# Patient Record
Sex: FEMALE | Race: WHITE | Employment: UNEMPLOYED | URBAN - METROPOLITAN AREA
[De-identification: names, ages, dates, MRNs, and addresses within clinical notes are randomized per-mention and may not be internally consistent; named-entity substitution may affect disease eponyms.]

---

## 2021-01-13 ENCOUNTER — VIRTUAL VISIT (OUTPATIENT)
Dept: FAMILY MEDICINE CLINIC | Age: 61
End: 2021-01-13
Payer: MEDICARE

## 2021-01-13 DIAGNOSIS — Z13.31 POSITIVE DEPRESSION SCREENING: Primary | ICD-10-CM

## 2021-01-13 DIAGNOSIS — M32.9 LUPUS (HCC): ICD-10-CM

## 2021-01-13 DIAGNOSIS — K59.09 CONSTIPATION, CHRONIC: ICD-10-CM

## 2021-01-13 PROCEDURE — G8431 POS CLIN DEPRES SCRN F/U DOC: HCPCS | Performed by: FAMILY MEDICINE

## 2021-01-13 PROCEDURE — 99204 OFFICE O/P NEW MOD 45 MIN: CPT | Performed by: FAMILY MEDICINE

## 2021-01-13 PROCEDURE — 3017F COLORECTAL CA SCREEN DOC REV: CPT | Performed by: FAMILY MEDICINE

## 2021-01-13 PROCEDURE — G8427 DOCREV CUR MEDS BY ELIG CLIN: HCPCS | Performed by: FAMILY MEDICINE

## 2021-01-13 RX ORDER — SUMATRIPTAN 100 MG/1
TABLET, FILM COATED ORAL
COMMUNITY
Start: 2020-12-17

## 2021-01-13 RX ORDER — HYDROCODONE BITARTRATE AND ACETAMINOPHEN 7.5; 325 MG/1; MG/1
TABLET ORAL
COMMUNITY
Start: 2020-12-18

## 2021-01-13 RX ORDER — PANTOPRAZOLE SODIUM 40 MG/1
40 GRANULE, DELAYED RELEASE ORAL
COMMUNITY

## 2021-01-13 RX ORDER — SIMVASTATIN 20 MG
20 TABLET ORAL DAILY
COMMUNITY
Start: 2020-12-17 | End: 2022-07-01 | Stop reason: SDUPTHER

## 2021-01-13 RX ORDER — ALPRAZOLAM 0.5 MG/1
1.5 TABLET ORAL DAILY
COMMUNITY
Start: 2020-12-24

## 2021-01-13 RX ORDER — MONTELUKAST SODIUM 10 MG/1
1 TABLET ORAL
COMMUNITY
End: 2021-02-25 | Stop reason: ALTCHOICE

## 2021-01-13 RX ORDER — M-VIT,TX,IRON,MINS/CALC/FOLIC 27MG-0.4MG
1 TABLET ORAL DAILY
COMMUNITY

## 2021-01-13 RX ORDER — ONDANSETRON HYDROCHLORIDE 8 MG/1
8 TABLET, FILM COATED ORAL PRN
COMMUNITY
Start: 2020-11-25

## 2021-01-13 RX ORDER — ACETAMINOPHEN 160 MG
TABLET,DISINTEGRATING ORAL
COMMUNITY

## 2021-01-13 RX ORDER — FAMOTIDINE 20 MG/1
20 TABLET, FILM COATED ORAL 2 TIMES DAILY PRN
COMMUNITY
Start: 2020-12-22 | End: 2021-02-25 | Stop reason: ALTCHOICE

## 2021-01-13 RX ORDER — CONJUGATED ESTROGENS 0.62 MG/G
CREAM VAGINAL
COMMUNITY
Start: 2020-11-02

## 2021-01-13 SDOH — ECONOMIC STABILITY: INCOME INSECURITY: HOW HARD IS IT FOR YOU TO PAY FOR THE VERY BASICS LIKE FOOD, HOUSING, MEDICAL CARE, AND HEATING?: NOT HARD AT ALL

## 2021-01-13 SDOH — ECONOMIC STABILITY: FOOD INSECURITY: WITHIN THE PAST 12 MONTHS, THE FOOD YOU BOUGHT JUST DIDN'T LAST AND YOU DIDN'T HAVE MONEY TO GET MORE.: NEVER TRUE

## 2021-01-13 SDOH — ECONOMIC STABILITY: FOOD INSECURITY: WITHIN THE PAST 12 MONTHS, YOU WORRIED THAT YOUR FOOD WOULD RUN OUT BEFORE YOU GOT MONEY TO BUY MORE.: NEVER TRUE

## 2021-01-13 SDOH — ECONOMIC STABILITY: TRANSPORTATION INSECURITY
IN THE PAST 12 MONTHS, HAS LACK OF TRANSPORTATION KEPT YOU FROM MEETINGS, WORK, OR FROM GETTING THINGS NEEDED FOR DAILY LIVING?: NO

## 2021-01-13 SDOH — ECONOMIC STABILITY: TRANSPORTATION INSECURITY
IN THE PAST 12 MONTHS, HAS THE LACK OF TRANSPORTATION KEPT YOU FROM MEDICAL APPOINTMENTS OR FROM GETTING MEDICATIONS?: NO

## 2021-01-13 ASSESSMENT — PATIENT HEALTH QUESTIONNAIRE - PHQ9
5. POOR APPETITE OR OVEREATING: 0
SUM OF ALL RESPONSES TO PHQ QUESTIONS 1-9: 10
4. FEELING TIRED OR HAVING LITTLE ENERGY: 2
7. TROUBLE CONCENTRATING ON THINGS, SUCH AS READING THE NEWSPAPER OR WATCHING TELEVISION: 3
10. IF YOU CHECKED OFF ANY PROBLEMS, HOW DIFFICULT HAVE THESE PROBLEMS MADE IT FOR YOU TO DO YOUR WORK, TAKE CARE OF THINGS AT HOME, OR GET ALONG WITH OTHER PEOPLE: 2
9. THOUGHTS THAT YOU WOULD BE BETTER OFF DEAD, OR OF HURTING YOURSELF: 0
3. TROUBLE FALLING OR STAYING ASLEEP: 2

## 2021-01-13 ASSESSMENT — ENCOUNTER SYMPTOMS
ABDOMINAL PAIN: 1
BACK PAIN: 1
CONSTIPATION: 1

## 2021-01-13 ASSESSMENT — COLUMBIA-SUICIDE SEVERITY RATING SCALE - C-SSRS
2. HAVE YOU ACTUALLY HAD ANY THOUGHTS OF KILLING YOURSELF?: NO
1. WITHIN THE PAST MONTH, HAVE YOU WISHED YOU WERE DEAD OR WISHED YOU COULD GO TO SLEEP AND NOT WAKE UP?: NO
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
## 2021-02-25 ENCOUNTER — OFFICE VISIT (OUTPATIENT)
Dept: FAMILY MEDICINE CLINIC | Age: 61
End: 2021-02-25
Payer: MEDICARE

## 2021-02-25 VITALS
HEART RATE: 103 BPM | OXYGEN SATURATION: 96 % | DIASTOLIC BLOOD PRESSURE: 84 MMHG | RESPIRATION RATE: 16 BRPM | BODY MASS INDEX: 23.55 KG/M2 | TEMPERATURE: 97.8 F | WEIGHT: 128 LBS | SYSTOLIC BLOOD PRESSURE: 136 MMHG | HEIGHT: 62 IN

## 2021-02-25 DIAGNOSIS — F41.9 ANXIETY: ICD-10-CM

## 2021-02-25 DIAGNOSIS — Z00.00 ROUTINE GENERAL MEDICAL EXAMINATION AT A HEALTH CARE FACILITY: Primary | ICD-10-CM

## 2021-02-25 DIAGNOSIS — H53.9 VISION CHANGES: ICD-10-CM

## 2021-02-25 PROCEDURE — G0439 PPPS, SUBSEQ VISIT: HCPCS | Performed by: FAMILY MEDICINE

## 2021-02-25 PROCEDURE — G8484 FLU IMMUNIZE NO ADMIN: HCPCS | Performed by: FAMILY MEDICINE

## 2021-02-25 PROCEDURE — 3017F COLORECTAL CA SCREEN DOC REV: CPT | Performed by: FAMILY MEDICINE

## 2021-02-25 ASSESSMENT — PATIENT HEALTH QUESTIONNAIRE - PHQ9
SUM OF ALL RESPONSES TO PHQ QUESTIONS 1-9: 0
2. FEELING DOWN, DEPRESSED OR HOPELESS: 0
SUM OF ALL RESPONSES TO PHQ QUESTIONS 1-9: 0
1. LITTLE INTEREST OR PLEASURE IN DOING THINGS: 0
SUM OF ALL RESPONSES TO PHQ QUESTIONS 1-9: 0

## 2021-02-25 NOTE — PATIENT INSTRUCTIONS
Personalized Preventive Plan for Juli Pope - 2/25/2021  Medicare offers a range of preventive health benefits. Some of the tests and screenings are paid in full while other may be subject to a deductible, co-insurance, and/or copay. Some of these benefits include a comprehensive review of your medical history including lifestyle, illnesses that may run in your family, and various assessments and screenings as appropriate. After reviewing your medical record and screening and assessments performed today your provider may have ordered immunizations, labs, imaging, and/or referrals for you. A list of these orders (if applicable) as well as your Preventive Care list are included within your After Visit Summary for your review. Other Preventive Recommendations:    · A preventive eye exam performed by an eye specialist is recommended every 1-2 years to screen for glaucoma; cataracts, macular degeneration, and other eye disorders. · A preventive dental visit is recommended every 6 months. · Try to get at least 150 minutes of exercise per week or 10,000 steps per day on a pedometer . · Order or download the FREE \"Exercise & Physical Activity: Your Everyday Guide\" from [employer]. Call 5-781.827.8878 or search [employer] online. · You need 1312-1882 mg of calcium and 0035-6523 IU of vitamin D per day. It is possible to meet your calcium requirement with diet alone, but a vitamin D supplement is usually necessary to meet this goal.  · When exposed to the sun, use a sunscreen that protects against both UVA and UVB radiation with an SPF of 30 or greater. Reapply every 2 to 3 hours or after sweating, drying off with a towel, or swimming. · Always wear a seat belt when traveling in a car. Always wear a helmet when riding a bicycle or motorcycle.

## 2021-02-25 NOTE — PROGRESS NOTES
Medicare Annual Wellness Visit  Name: Elysia Calderon Date: 2021   MRN: <[id number]> Sex: Female   Age: 62 y.o. Ethnicity: Non-/Non    : 1960 Race: Marni Urban is here for Medicare AWV    Screenings for behavioral, psychosocial and functional/safety risks, and cognitive dysfunction are all negative except as indicated below. These results, as well as other patient data from the [address] form, are documented in Flowsheets linked to this Encounter. Allergies   Allergen Reactions    Ibuprofen     Methadone     Carisoprodol Nausea And Vomiting       Prior to Visit Medications    Medication Sig Taking? Authorizing Provider   HYDROcodone-acetaminophen ([facility]) 7.5-325 MG per tablet  Yes Historical Provider, MD   simvastatin (ZOCOR) 20 MG tablet Take 20 mg by mouth daily  Yes Historical Provider, MD   SUMAtriptan (IMITREX) 100 MG tablet once as needed  Yes Historical Provider, MD   ondansetron (ZOFRAN) 8 MG tablet Take 8 mg by mouth as needed  Yes Historical Provider, MD   ALPRAZolam (XANAX) 0.5 MG tablet 1.5 mg daily. Yes Historical Provider, MD   PREMARIN 0.625 MG/GM vaginal cream  Yes Historical Provider, MD   Cholecalciferol (VITAMIN D3) 50 MCG ( UT) CAPS Take by mouth Yes Historical Provider, MD   Multiple Vitamins-Minerals (THERAPEUTIC MULTIVITAMIN-MINERALS) tablet Take 1 tablet by mouth daily Yes Historical Provider, MD   Fexofenadine HCl (ALLEGRA PO) Take 180 mg by mouth as needed Yes Historical Provider, MD   pantoprazole sodium (PROTONIX) 40 MG PACK packet Take 40 mg by mouth every morning (before breakfast) Yes Historical Provider, MD   medical marijuana Take by mouth as needed.  Yes Historical Provider, MD       Past Medical History:   Diagnosis Date    Hyperlipidemia     Lupus ([facility] [address]        Past Surgical History:   Procedure Laterality Date    APPENDECTOMY      TONSILLECTOMY      TUBAL LIGATION  1987       Family History Problem Relation Age of Onset    Heart Attack Mother     Alzheimer's Disease Father     Heart Disease Father     Atrial Fibrillation Father     Cancer Maternal Grandmother         stomach    Other Maternal Grandfather         tragic injury    Alzheimer's Disease Paternal Grandmother     Cancer Paternal Grandfather         prostate       CareTeam (Including outside providers/suppliers regularly involved in providing care):   Patient Care Team:  Fauzia hCambers DO as PCP - General (Family Medicine)  Fauzia Chambers DO as PCP - [facility] Empaneled Provider    Wt Readings from Last 3 Encounters:   02/25/21 128 lb (58.1 kg)     Vitals:    02/25/21 0926   BP: 136/84   Site: Left Upper Arm   Position: Sitting   Pulse: 103   Resp: 16   Temp: 97.8 °F (36.6 °C)   TempSrc: Temporal   SpO2: 96%   Weight: 128 lb (58.1 kg)   Height: 5' 1.75\" (1.568 m)     Body mass index is 23.6 kg/m². Based upon direct observation of the patient, evaluation of cognition reveals recent and remote memory intact. Patient's complete Health Risk Assessment and screening values have been reviewed and are found in Flowsheets. The following problems were reviewed today and where indicated follow up appointments were made and/or referrals ordered. Positive Risk Factor Screenings with Interventions:         Substance History:  Social History     Tobacco History     Smoking Status  Current Every Day Smoker Smoking Frequency  0.25 packs/day for 25 years (6.25 pk yrs) Smoking Tobacco Type  Cigarettes    Smokeless Tobacco Use  Never Used          Alcohol History     Alcohol Use Status  Not Currently          Drug Use     Drug Use Status  Yes Types  Marijuana Comment  gummies          Sexual Activity     Sexually Active  Yes Partners  Male               Alcohol Screening:       A score of 8 or more is associated with harmful or hazardous drinking.  A score of 13 or more in women, and 15 or more in men, is likely to indicate alcohol dependence. Substance Abuse Interventions:  · Tobacco abuse:  provider spent 0 minutes counseling patient. Will address tobacco use during future visits. General Health and ACP:  General  In general, how would you say your health is?: Fair  In the past 7 days, have you experienced any of the following? New or Increased Pain, New or Increased Fatigue, Loneliness, Social Isolation, Stress or Anger?: None of These  Do you get the social and emotional support that you need?: Yes  Do you have a Living Will?: Yes  Advance Directives     Power of [facility] Will ACP-Advance Directive ACP-Power of     Not on File Not on File Not on File Not on File      General Health Risk Interventions:  · No interventions needed in this area at this time. Hearing/Vision:  No exam data present  Hearing/Vision  Do you or your family notice any trouble with your hearing that hasn't been managed with hearing aids?: No  Do you have difficulty driving, watching TV, or doing any of your daily activities because of your eyesight?: (!) Yes  Have you had an eye exam within the past year?: (!) No  Hearing/Vision Interventions:  · Vision concerns:  ophthalmology/optometry referral provided      Personalized Preventive Plan   Current Health Maintenance Status    There is no immunization history on file for this patient.      Health Maintenance   Topic Date Due    Hepatitis C screen  1960    Pneumococcal 0-64 years Vaccine (1 of 1 - PPSV23) 06/06/1967    Lipid screen  06/06/1971    HIV screen  06/06/1976    DTaP/Tdap/Td vaccine (1 - Tdap) 06/06/1980    Cervical cancer screen  06/06/1982    Breast cancer screen  06/06/2011    Shingles Vaccine (1 of 2) 06/06/2011    Colon cancer screen colonoscopy  06/06/2011    Flu vaccine (1) 09/01/2020    Annual Wellness Visit (AWV)  01/03/2021    Hepatitis A vaccine  Aged Out    Hepatitis B vaccine  Aged Out    Hib vaccine  Aged Out    Meningococcal (ACWY) vaccine  Aged Out Recommendations for Preventive Services Due: see orders and patient instructions/AVS.  . Recommended screening schedule for the next 5-10 years is provided to the patient in written form: see Patient Instructions/AVS.    Lorena Lui was seen today for medicare aw.     Diagnoses and all orders for this visit:    Routine general medical examination at a health care facility

## 2021-02-26 ENCOUNTER — NURSE ONLY (OUTPATIENT)
Dept: FAMILY MEDICINE CLINIC | Age: 61
End: 2021-02-26

## 2021-02-26 VITALS — TEMPERATURE: 98.4 F

## 2021-02-26 DIAGNOSIS — M32.9 LUPUS (HCC): ICD-10-CM

## 2021-02-27 LAB
A/G RATIO: 1.7 (ref 1.1–2.2)
ALBUMIN SERPL-MCNC: 4.6 G/DL (ref 3.4–5)
ALP BLD-CCNC: 100 U/L (ref 40–129)
ALT SERPL-CCNC: 20 U/L (ref 10–40)
ANION GAP SERPL CALCULATED.3IONS-SCNC: 10 MMOL/L (ref 3–16)
AST SERPL-CCNC: 22 U/L (ref 15–37)
BASOPHILS ABSOLUTE: 0 K/UL (ref 0–0.2)
BASOPHILS RELATIVE PERCENT: 0.5 %
BILIRUB SERPL-MCNC: 0.3 MG/DL (ref 0–1)
BUN BLDV-MCNC: 19 MG/DL (ref 7–20)
CALCIUM SERPL-MCNC: 10 MG/DL (ref 8.3–10.6)
CHLORIDE BLD-SCNC: 109 MMOL/L (ref 99–110)
CHOLESTEROL, TOTAL: 193 MG/DL (ref 0–199)
CO2: 27 MMOL/L (ref 21–32)
CREAT SERPL-MCNC: 0.7 MG/DL (ref 0.6–1.1)
EOSINOPHILS ABSOLUTE: 0 K/UL (ref 0–0.6)
EOSINOPHILS RELATIVE PERCENT: 0.6 %
GFR AFRICAN AMERICAN: >60
GFR NON-AFRICAN AMERICAN: >60
GLOBULIN: 2.7 G/DL
GLUCOSE BLD-MCNC: 86 MG/DL (ref 70–99)
HCT VFR BLD CALC: 41.8 % (ref 36–48)
HDLC SERPL-MCNC: 43 MG/DL (ref 40–60)
HEMOGLOBIN: 14 G/DL (ref 12–16)
LDL CHOLESTEROL CALCULATED: 121 MG/DL
LYMPHOCYTES ABSOLUTE: 2.3 K/UL (ref 1–5.1)
LYMPHOCYTES RELATIVE PERCENT: 34.8 %
MCH RBC QN AUTO: 31.8 PG (ref 26–34)
MCHC RBC AUTO-ENTMCNC: 33.5 G/DL (ref 31–36)
MCV RBC AUTO: 94.8 FL (ref 80–100)
MONOCYTES ABSOLUTE: 0.4 K/UL (ref 0–1.3)
MONOCYTES RELATIVE PERCENT: 5.9 %
NEUTROPHILS ABSOLUTE: 3.9 K/UL (ref 1.7–7.7)
NEUTROPHILS RELATIVE PERCENT: 58.2 %
PDW BLD-RTO: 13.1 % (ref 12.4–15.4)
PLATELET # BLD: 189 K/UL (ref 135–450)
PMV BLD AUTO: 9.3 FL (ref 5–10.5)
POTASSIUM SERPL-SCNC: 4.7 MMOL/L (ref 3.5–5.1)
RBC # BLD: 4.41 M/UL (ref 4–5.2)
SODIUM BLD-SCNC: 146 MMOL/L (ref 136–145)
TOTAL PROTEIN: 7.3 G/DL (ref 6.4–8.2)
TRIGL SERPL-MCNC: 144 MG/DL (ref 0–150)
TSH SERPL DL<=0.05 MIU/L-ACNC: 1.05 UIU/ML (ref 0.27–4.2)
VLDLC SERPL CALC-MCNC: 29 MG/DL
WBC # BLD: 6.7 K/UL (ref 4–11)

## 2022-06-27 ENCOUNTER — TELEMEDICINE (OUTPATIENT)
Dept: FAMILY MEDICINE CLINIC | Age: 62
End: 2022-06-27
Payer: MEDICARE

## 2022-06-27 DIAGNOSIS — J30.9 ALLERGIC RHINITIS, UNSPECIFIED SEASONALITY, UNSPECIFIED TRIGGER: ICD-10-CM

## 2022-06-27 DIAGNOSIS — M32.9 LUPUS (HCC): ICD-10-CM

## 2022-06-27 DIAGNOSIS — Z13.6 ENCOUNTER FOR SCREENING FOR CARDIOVASCULAR DISORDERS: ICD-10-CM

## 2022-06-27 DIAGNOSIS — Z12.31 ENCOUNTER FOR SCREENING MAMMOGRAM FOR MALIGNANT NEOPLASM OF BREAST: ICD-10-CM

## 2022-06-27 DIAGNOSIS — H92.09 OTALGIA, UNSPECIFIED LATERALITY: Primary | ICD-10-CM

## 2022-06-27 DIAGNOSIS — H53.9 VISION CHANGES: ICD-10-CM

## 2022-06-27 PROCEDURE — 3017F COLORECTAL CA SCREEN DOC REV: CPT | Performed by: NURSE PRACTITIONER

## 2022-06-27 PROCEDURE — G8421 BMI NOT CALCULATED: HCPCS | Performed by: NURSE PRACTITIONER

## 2022-06-27 PROCEDURE — 4004F PT TOBACCO SCREEN RCVD TLK: CPT | Performed by: NURSE PRACTITIONER

## 2022-06-27 PROCEDURE — 99214 OFFICE O/P EST MOD 30 MIN: CPT | Performed by: NURSE PRACTITIONER

## 2022-06-27 PROCEDURE — G8427 DOCREV CUR MEDS BY ELIG CLIN: HCPCS | Performed by: NURSE PRACTITIONER

## 2022-06-27 RX ORDER — METHYLPREDNISOLONE 4 MG/1
TABLET ORAL
Qty: 1 KIT | Refills: 0 | Status: SHIPPED | OUTPATIENT
Start: 2022-06-27 | End: 2022-07-06

## 2022-06-27 ASSESSMENT — PATIENT HEALTH QUESTIONNAIRE - PHQ9
SUM OF ALL RESPONSES TO PHQ QUESTIONS 1-9: 0
SUM OF ALL RESPONSES TO PHQ9 QUESTIONS 1 & 2: 0
1. LITTLE INTEREST OR PLEASURE IN DOING THINGS: 0
SUM OF ALL RESPONSES TO PHQ QUESTIONS 1-9: 0
2. FEELING DOWN, DEPRESSED OR HOPELESS: 0
SUM OF ALL RESPONSES TO PHQ QUESTIONS 1-9: 0
SUM OF ALL RESPONSES TO PHQ QUESTIONS 1-9: 0

## 2022-06-27 ASSESSMENT — ANXIETY QUESTIONNAIRES
2. NOT BEING ABLE TO STOP OR CONTROL WORRYING: 0
6. BECOMING EASILY ANNOYED OR IRRITABLE: 0
IF YOU CHECKED OFF ANY PROBLEMS ON THIS QUESTIONNAIRE, HOW DIFFICULT HAVE THESE PROBLEMS MADE IT FOR YOU TO DO YOUR WORK, TAKE CARE OF THINGS AT HOME, OR GET ALONG WITH OTHER PEOPLE: NOT DIFFICULT AT ALL
1. FEELING NERVOUS, ANXIOUS, OR ON EDGE: 0
4. TROUBLE RELAXING: 0
5. BEING SO RESTLESS THAT IT IS HARD TO SIT STILL: 0
GAD7 TOTAL SCORE: 0
3. WORRYING TOO MUCH ABOUT DIFFERENT THINGS: 0
7. FEELING AFRAID AS IF SOMETHING AWFUL MIGHT HAPPEN: 0

## 2022-06-27 ASSESSMENT — ENCOUNTER SYMPTOMS
CHEST TIGHTNESS: 0
SINUS PRESSURE: 1
EYE PAIN: 0
EYE REDNESS: 0
COUGH: 1
GASTROINTESTINAL NEGATIVE: 1
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
## 2022-06-28 DIAGNOSIS — J30.9 ALLERGIC RHINITIS, UNSPECIFIED SEASONALITY, UNSPECIFIED TRIGGER: ICD-10-CM

## 2022-06-28 DIAGNOSIS — H53.9 VISION CHANGES: ICD-10-CM

## 2022-06-28 DIAGNOSIS — Z13.6 ENCOUNTER FOR SCREENING FOR CARDIOVASCULAR DISORDERS: ICD-10-CM

## 2022-06-28 DIAGNOSIS — M32.9 LUPUS (HCC): ICD-10-CM

## 2022-06-28 DIAGNOSIS — H92.09 OTALGIA, UNSPECIFIED LATERALITY: ICD-10-CM

## 2022-06-28 LAB
A/G RATIO: 1.8 (ref 1.1–2.2)
ALBUMIN SERPL-MCNC: 4.6 G/DL (ref 3.4–5)
ALP BLD-CCNC: 99 U/L (ref 40–129)
ALT SERPL-CCNC: 16 U/L (ref 10–40)
ANION GAP SERPL CALCULATED.3IONS-SCNC: 18 MMOL/L (ref 3–16)
AST SERPL-CCNC: 24 U/L (ref 15–37)
BILIRUB SERPL-MCNC: <0.2 MG/DL (ref 0–1)
BUN BLDV-MCNC: 16 MG/DL (ref 7–20)
CALCIUM SERPL-MCNC: 10 MG/DL (ref 8.3–10.6)
CHLORIDE BLD-SCNC: 100 MMOL/L (ref 99–110)
CHOLESTEROL, TOTAL: 244 MG/DL (ref 0–199)
CO2: 22 MMOL/L (ref 21–32)
CREAT SERPL-MCNC: 0.8 MG/DL (ref 0.6–1.2)
GFR AFRICAN AMERICAN: >60
GFR NON-AFRICAN AMERICAN: >60
GLUCOSE BLD-MCNC: 107 MG/DL (ref 70–99)
HDLC SERPL-MCNC: 60 MG/DL (ref 40–60)
LDL CHOLESTEROL CALCULATED: 166 MG/DL
POTASSIUM SERPL-SCNC: 4.5 MMOL/L (ref 3.5–5.1)
SODIUM BLD-SCNC: 140 MMOL/L (ref 136–145)
TOTAL PROTEIN: 7.2 G/DL (ref 6.4–8.2)
TRIGL SERPL-MCNC: 91 MG/DL (ref 0–150)
TSH REFLEX FT4: 0.6 UIU/ML (ref 0.27–4.2)
VLDLC SERPL CALC-MCNC: 18 MG/DL

## 2022-07-01 RX ORDER — SIMVASTATIN 20 MG
20 TABLET ORAL DAILY
Qty: 30 TABLET | Refills: 2 | Status: SHIPPED | OUTPATIENT
Start: 2022-07-01

## 2022-07-05 PROBLEM — R73.01 ELEVATED FASTING GLUCOSE: Status: ACTIVE | Noted: 2022-07-05

## 2022-07-05 PROBLEM — E78.00 ELEVATED LDL CHOLESTEROL LEVEL: Status: ACTIVE | Noted: 2022-07-05

## 2022-07-05 NOTE — PROGRESS NOTES
2022    TELEHEALTH EVALUATION -- Audio/Visual (During PYGAE-58 public health emergency)    HPI:    Jenny Lieberman (:  1960) has requested an audio/video evaluation for the following concern(s):    Pt presents today via video visit for lab results follow-up. Labs from 2022 reviewed at today's visit, within normal limits except for glucose 107, total cholesterol 244, . States that her mother has diabetes. Has been off the Simvastatin 20 mg. Was refilled by Zoey Ching CNP on 22 and pt has started taking again. Admits to MARX's last week after snorkeling. Sees Pain Management once a month. Also states that she had COVID in 2021 and has palpitations since then. Occur 1-2 times a day, admits to anxiety and occasional palpations before having COVID    Review of Systems   Constitutional: Negative for fatigue. Eyes: Negative for visual disturbance. Neurological: Positive for headaches. Prior to Visit Medications    Medication Sig Taking? Authorizing Provider   simvastatin (ZOCOR) 20 MG tablet Take 1 tablet by mouth daily Yes ANEL Sheffield CNP   HYDROcodone-acetaminophen ([address]) 7.5-325 MG per tablet  Yes Historical Provider, MD   SUMAtriptan (IMITREX) 100 MG tablet once as needed  Yes Historical Provider, MD   ondansetron (ZOFRAN) 8 MG tablet Take 8 mg by mouth as needed  Yes Historical Provider, MD   ALPRAZolam [facility]) 0.5 MG tablet 1.5 mg daily.   Yes Historical Provider, MD   PREMARIN 0.625 MG/GM vaginal cream  Yes Historical Provider, MD   Cholecalciferol (VITAMIN D3) 50 MCG (2000) CAPS Take by mouth Yes Historical Provider, MD   Multiple Vitamins-Minerals (THERAPEUTIC MULTIVITAMIN-MINERALS) tablet Take 1 tablet by mouth daily Yes Historical Provider, MD   Fexofenadine HCl (ALLEGRA PO) Take 180 mg by mouth as needed Yes Historical Provider, MD   pantoprazole sodium (PROTONIX) 40 MG PACK packet Take 40 mg by mouth every morning (before exam to video visit)          [x] No gaze palsy        [] Abnormal-         Skin:        [x] No significant exanthematous lesions or discoloration noted on facial skin         [] Abnormal-            Psychiatric:       [x] Normal Affect [] No Hallucinations        [] Abnormal-     Other pertinent observable physical exam findings-     ASSESSMENT/PLAN:   Diagnosis Orders   1. Elevated LDL cholesterol level  Has restarted Simvastatin, recheck lipd panel in 3 months   2. Elevated fasting glucose  Recheck in 3 months     3. Palpitations                                                              Referral Cardiology, Dr. Jeane Kong, was evaluated through a synchronous (real-time) audio-video encounter. The patient (or guardian if applicable) is aware that this is a billable service. Verbal consent to proceed has been obtained within the past 12 months. The visit was conducted pursuant to the emergency declaration under the [address] [place] [address] authority and the [employer] and [employer] General Act. Patient identification was verified, and a caregiver was present when appropriate. The patient was located in a state where the provider was credentialed to provide care. Total time spent on this encounter: Not billed by time    --Kirk Cowart DO on 7/6/2022 at 2:34 PM    An electronic signature was used to authenticate this note.

## 2022-07-06 ENCOUNTER — TELEMEDICINE (OUTPATIENT)
Dept: FAMILY MEDICINE CLINIC | Age: 62
End: 2022-07-06
Payer: MEDICARE

## 2022-07-06 ENCOUNTER — HOSPITAL ENCOUNTER (OUTPATIENT)
Dept: MAMMOGRAPHY | Age: 62
Discharge: HOME | End: 2022-07-06
Payer: MEDICARE

## 2022-07-06 VITALS — BODY MASS INDEX: 22.66 KG/M2 | WEIGHT: 120 LBS | HEIGHT: 61 IN

## 2022-07-06 DIAGNOSIS — R00.2 PALPITATIONS: ICD-10-CM

## 2022-07-06 DIAGNOSIS — E78.00 ELEVATED LDL CHOLESTEROL LEVEL: Primary | ICD-10-CM

## 2022-07-06 DIAGNOSIS — Z12.31 ENCOUNTER FOR SCREENING MAMMOGRAM FOR MALIGNANT NEOPLASM OF BREAST: ICD-10-CM

## 2022-07-06 DIAGNOSIS — R73.01 ELEVATED FASTING GLUCOSE: ICD-10-CM

## 2022-07-06 PROCEDURE — 99214 OFFICE O/P EST MOD 30 MIN: CPT | Performed by: FAMILY MEDICINE

## 2022-07-06 PROCEDURE — 77063 BREAST TOMOSYNTHESIS BI: CPT

## 2022-07-06 ASSESSMENT — ANXIETY QUESTIONNAIRES
GAD7 TOTAL SCORE: 0
IF YOU CHECKED OFF ANY PROBLEMS ON THIS QUESTIONNAIRE, HOW DIFFICULT HAVE THESE PROBLEMS MADE IT FOR YOU TO DO YOUR WORK, TAKE CARE OF THINGS AT HOME, OR GET ALONG WITH OTHER PEOPLE: NOT DIFFICULT AT ALL
6. BECOMING EASILY ANNOYED OR IRRITABLE: 0
2. NOT BEING ABLE TO STOP OR CONTROL WORRYING: 0
3. WORRYING TOO MUCH ABOUT DIFFERENT THINGS: 0
1. FEELING NERVOUS, ANXIOUS, OR ON EDGE: 0
4. TROUBLE RELAXING: 0
5. BEING SO RESTLESS THAT IT IS HARD TO SIT STILL: 0
7. FEELING AFRAID AS IF SOMETHING AWFUL MIGHT HAPPEN: 0

## 2022-07-06 ASSESSMENT — PATIENT HEALTH QUESTIONNAIRE - PHQ9
SUM OF ALL RESPONSES TO PHQ QUESTIONS 1-9: 0
2. FEELING DOWN, DEPRESSED OR HOPELESS: 0
SUM OF ALL RESPONSES TO PHQ9 QUESTIONS 1 & 2: 0
SUM OF ALL RESPONSES TO PHQ QUESTIONS 1-9: 0
1. LITTLE INTEREST OR PLEASURE IN DOING THINGS: 0
SUM OF ALL RESPONSES TO PHQ QUESTIONS 1-9: 0
SUM OF ALL RESPONSES TO PHQ QUESTIONS 1-9: 0

## 2022-07-13 NOTE — RESULT ENCOUNTER NOTE
Your mammogram is normal. We can repeat the screening in one year. Thank you for getting this important health screening accomplished.
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
## 2022-08-12 ENCOUNTER — OFFICE VISIT (OUTPATIENT)
Dept: CARDIOLOGY CLINIC | Age: 62
End: 2022-08-12
Payer: MEDICARE

## 2022-08-12 ENCOUNTER — TELEPHONE (OUTPATIENT)
Dept: CARDIOLOGY CLINIC | Age: 62
End: 2022-08-12

## 2022-08-12 VITALS
BODY MASS INDEX: 23.19 KG/M2 | HEIGHT: 61 IN | HEART RATE: 86 BPM | WEIGHT: 122.85 LBS | OXYGEN SATURATION: 99 % | SYSTOLIC BLOOD PRESSURE: 134 MMHG | DIASTOLIC BLOOD PRESSURE: 80 MMHG

## 2022-08-12 DIAGNOSIS — R42 DIZZINESS: ICD-10-CM

## 2022-08-12 DIAGNOSIS — R00.2 PALPITATIONS: ICD-10-CM

## 2022-08-12 DIAGNOSIS — I49.9 IRREGULAR HEART RATE: Primary | ICD-10-CM

## 2022-08-12 DIAGNOSIS — R42 LIGHT HEADED: ICD-10-CM

## 2022-08-12 LAB — MAGNESIUM: 2 MG/DL (ref 1.8–2.4)

## 2022-08-12 PROCEDURE — 99204 OFFICE O/P NEW MOD 45 MIN: CPT | Performed by: INTERNAL MEDICINE

## 2022-08-12 PROCEDURE — 93270 REMOTE 30 DAY ECG REV/REPORT: CPT | Performed by: INTERNAL MEDICINE

## 2022-08-12 PROCEDURE — 93000 ELECTROCARDIOGRAM COMPLETE: CPT | Performed by: INTERNAL MEDICINE

## 2022-08-12 RX ORDER — ROSUVASTATIN CALCIUM 20 MG/1
20 TABLET, COATED ORAL DAILY
Qty: 90 TABLET | Refills: 3 | Status: SHIPPED | OUTPATIENT
Start: 2022-08-12

## 2022-08-12 RX ORDER — ASPIRIN 81 MG/1
81 TABLET ORAL DAILY
Qty: 90 TABLET | Refills: 1
Start: 2022-08-12

## 2022-08-12 NOTE — PATIENT INSTRUCTIONS
Plan:  Smoking cessation encouraged. Recommend starting enteric coated Aspirin 81 mg daily due to coronary artery disease. Stop simvastatin   Start Crestor 20 mg daily   Cardiac event monitor for 1 week   Recommend an echocardiogram which is an ultrasound of your heart to evaluate heart function, structures and valves. Labs- Magnesium level   Discussed options for palpitations including medication therapy- she would like to wait until after testing   Cardiac medications reviewed including indications and pertinent side effects. Medication list updated at this visit. Check blood pressure and heart rate at home a few times per week- keep a log with dates and times and bring to office visit   Regular exercise and following a healthy diet encouraged   Follow up with me one month     Your provider has ordered testing for further evaluation. An order/prescription has been included in your paper work. To schedule outpatient testing, contact Central Scheduling by calling [address] [place] [address] ([phone]).

## 2022-08-12 NOTE — TELEPHONE ENCOUNTER
Monitor placed by [address] [place] [address]  Length of monitor 7 DAYS  Monitor ordered by Galdino Arenas  Serial number [id number]  Kit ID [id number]  Activation successful prior to pt leaving office?  Yes

## 2022-08-15 ENCOUNTER — TELEPHONE (OUTPATIENT)
Dept: CARDIOLOGY CLINIC | Age: 62
End: 2022-08-15

## 2022-08-15 NOTE — TELEPHONE ENCOUNTER
----- Message from Didi Lewis MD sent at 8/15/2022 11:21 AM EDT -----  Magnesium is normal.  No changes

## 2022-08-15 NOTE — TELEPHONE ENCOUNTER
Created telephone encounter. Spoke with Stanley Wu relayed message per [address] regarding labs. Pt verbalized understanding.

## 2022-08-16 ENCOUNTER — PROCEDURE VISIT (OUTPATIENT)
Dept: CARDIOLOGY CLINIC | Age: 62
End: 2022-08-16
Payer: MEDICARE

## 2022-08-16 DIAGNOSIS — R42 DIZZINESS: ICD-10-CM

## 2022-08-16 DIAGNOSIS — R00.2 PALPITATIONS: ICD-10-CM

## 2022-08-16 DIAGNOSIS — R42 LIGHT HEADED: ICD-10-CM

## 2022-08-16 LAB
LV EF: 53 %
LVEF MODALITY: NORMAL

## 2022-08-16 PROCEDURE — 93306 TTE W/DOPPLER COMPLETE: CPT | Performed by: INTERNAL MEDICINE

## 2022-08-17 ENCOUNTER — TELEPHONE (OUTPATIENT)
Dept: CARDIOLOGY CLINIC | Age: 62
End: 2022-08-17

## 2022-08-17 NOTE — TELEPHONE ENCOUNTER
Spoke with patient and reviewed results. She is not having any SOB. Currently wearing her monitor until Friday.

## 2022-08-17 NOTE — TELEPHONE ENCOUNTER
----- Message from Cathy Carlton MD sent at 8/16/2022  4:17 PM EDT -----  Please let the patient know the following: Normal echocardiogram showing normal heart strength and no significant valve disease. There is comment made that we cannot exclude small amount of fluid around one of the lungs caught by imaging. Does not appear that she is having any shortness of breath per [facility] recent note. If no symptoms, no further actions needed. If develops any issues we could perform chest x-ray to confirm or exclude this finding.

## 2022-09-02 PROCEDURE — 93272 ECG/REVIEW INTERPRET ONLY: CPT | Performed by: INTERNAL MEDICINE

## 2022-11-10 ENCOUNTER — OFFICE VISIT (OUTPATIENT)
Dept: FAMILY MEDICINE CLINIC | Age: 62
End: 2022-11-10
Payer: MEDICARE

## 2022-11-10 VITALS
TEMPERATURE: 96.2 F | DIASTOLIC BLOOD PRESSURE: 62 MMHG | HEART RATE: 97 BPM | BODY MASS INDEX: 23.05 KG/M2 | SYSTOLIC BLOOD PRESSURE: 104 MMHG | RESPIRATION RATE: 16 BRPM | WEIGHT: 122 LBS | OXYGEN SATURATION: 97 %

## 2022-11-10 DIAGNOSIS — J32.9 SINOBRONCHITIS: Primary | ICD-10-CM

## 2022-11-10 DIAGNOSIS — J40 SINOBRONCHITIS: Primary | ICD-10-CM

## 2022-11-10 PROCEDURE — 99213 OFFICE O/P EST LOW 20 MIN: CPT | Performed by: NURSE PRACTITIONER

## 2022-11-10 RX ORDER — AMOXICILLIN AND CLAVULANATE POTASSIUM 875; 125 MG/1; MG/1
1 TABLET, FILM COATED ORAL 2 TIMES DAILY
Qty: 20 TABLET | Refills: 0 | Status: SHIPPED | OUTPATIENT
Start: 2022-11-10 | End: 2022-11-20

## 2022-11-10 RX ORDER — GUAIFENESIN 600 MG/1
600 TABLET, EXTENDED RELEASE ORAL 2 TIMES DAILY
Qty: 30 TABLET | Refills: 0 | Status: SHIPPED | OUTPATIENT
Start: 2022-11-10 | End: 2022-11-25

## 2022-11-10 ASSESSMENT — ENCOUNTER SYMPTOMS
RHINORRHEA: 0
SORE THROAT: 0
COUGH: 1
SINUS PRESSURE: 1
ABDOMINAL PAIN: 0
SHORTNESS OF BREATH: 0
CHEST TIGHTNESS: 1
EYES NEGATIVE: 1
SINUS PAIN: 0

## 2022-11-10 NOTE — PROGRESS NOTES
11/10/2022    This is a 65 y.o. female   Chief Complaint   Patient presents with    Cough     Started after raking leaves Saturday. States she took a COVID test yesterday   . Krystyna Hall is seen today for illness. Her symtpoms started     Cough  This is a new problem. The current episode started in the past 7 days (5 days). The problem has been gradually worsening. The problem occurs every few minutes. The cough is Non-productive. Associated symptoms include ear congestion, headaches and nasal congestion. Pertinent negatives include no chest pain, chills, fever, postnasal drip, rhinorrhea, sore throat or shortness of breath. Nothing aggravates the symptoms. The treatment provided no relief. Her past medical history is significant for environmental allergies. There is no history of asthma, bronchiectasis, bronchitis, COPD, emphysema or pneumonia. Patient Active Problem List   Diagnosis    Lupus (HCC)    Elevated LDL cholesterol level    Elevated fasting glucose       Current Outpatient Medications   Medication Sig Dispense Refill    aspirin EC 81 MG EC tablet Take 1 tablet by mouth in the morning. 90 tablet 1    rosuvastatin (CRESTOR) 20 MG tablet Take 1 tablet by mouth in the morning. 90 tablet 3    simvastatin (ZOCOR) 20 MG tablet Take 1 tablet by mouth daily 30 tablet 2    HYDROcodone-acetaminophen (NORCO) 7.5-325 MG per tablet       SUMAtriptan (IMITREX) 100 MG tablet once as needed       ondansetron (ZOFRAN) 8 MG tablet Take 8 mg by mouth as needed       ALPRAZolam (XANAX) 0.5 MG tablet 1.5 mg daily.        PREMARIN 0.625 MG/GM vaginal cream       Cholecalciferol (VITAMIN D3) 50 MCG (2000 UT) CAPS Take by mouth      Multiple Vitamins-Minerals (THERAPEUTIC MULTIVITAMIN-MINERALS) tablet Take 1 tablet by mouth daily      Fexofenadine HCl (ALLEGRA PO) Take 180 mg by mouth as needed      pantoprazole sodium (PROTONIX) 40 MG PACK packet Take 40 mg by mouth every morning (before breakfast)      medical marijuana Take by mouth as needed. No current facility-administered medications for this visit. Allergies   Allergen Reactions    Ibuprofen     Methadone     Carisoprodol Nausea And Vomiting       /62 (Site: Left Upper Arm, Position: Sitting)   Pulse 97   Temp (!) 96.2 °F (35.7 °C) (Infrared)   Resp 16   Wt 122 lb (55.3 kg)   LMP 02/25/1998 (Approximate)   SpO2 97%   BMI 23.05 kg/m²     Social History     Tobacco Use    Smoking status: Every Day     Packs/day: 0.25     Years: 25.00     Pack years: 6.25     Types: Cigarettes    Smokeless tobacco: Never   Substance Use Topics    Alcohol use: Not Currently       Review of Systems   Constitutional:  Negative for chills and fever. HENT:  Positive for congestion and sinus pressure. Negative for postnasal drip, rhinorrhea, sinus pain and sore throat. Eyes: Negative. Respiratory:  Positive for cough and chest tightness. Negative for shortness of breath. Cardiovascular: Negative. Negative for chest pain and leg swelling. Gastrointestinal:  Negative for abdominal pain. Colonoscopy done this year- Dr Audrey Brown:         Pap- done at [facility] Dr Mckeon Pel- [phone]- done 2022   Musculoskeletal: Negative. Skin: Negative. Allergic/Immunologic: Positive for environmental allergies. Neurological:  Positive for headaches. Physical Exam  Vitals and nursing note reviewed. Constitutional:       General: She is not in acute distress. Appearance: She is well-developed and normal weight. She is not ill-appearing or diaphoretic. HENT:      Head: Normocephalic and atraumatic. Right Ear: External ear normal.      Left Ear: External ear normal.      Nose: Congestion present. Mouth/Throat:      Mouth: Mucous membranes are moist.      Pharynx: Posterior oropharyngeal erythema present. No oropharyngeal exudate. Eyes:      General:         Right eye: No discharge. Left eye: No discharge.       Conjunctiva/sclera: Conjunctivae normal.   Cardiovascular:      Rate and Rhythm: Normal rate and regular rhythm. Heart sounds: Normal heart sounds. No murmur heard. No friction rub. No gallop. Pulmonary:      Effort: Pulmonary effort is normal. No respiratory distress. Breath sounds: Rhonchi present. No wheezing or rales. Comments: Diminished lower lobes  Abdominal:      General: Bowel sounds are normal. There is no distension. Palpations: Abdomen is soft. Tenderness: There is no abdominal tenderness. Musculoskeletal:         General: No tenderness. Normal range of motion. Cervical back: Normal range of motion and neck supple. Lymphadenopathy:      Cervical: No cervical adenopathy. Skin:     General: Skin is warm and dry. Coloration: Skin is not pale. Findings: No erythema or rash. Neurological:      General: No focal deficit present. Mental Status: She is alert and oriented to person, place, and time. Motor: No abnormal muscle tone. Coordination: Coordination normal.   Psychiatric:         Behavior: Behavior normal.         Thought Content: Thought content normal.         Judgment: Judgment normal.       Diagnosis       ICD-10-CM    1. Sinobronchitis  J32.9 amoxicillin-clavulanate (AUGMENTIN) 875-125 MG per tablet    J40            Plan    Push fluids  Start mucinex, antibiotics  Continue Flonase- one spray each nostril twice daily  Continue allegra    No orders of the defined types were placed in this encounter. Orders Placed This Encounter   Medications    amoxicillin-clavulanate (AUGMENTIN) 875-125 MG per tablet     Sig: Take 1 tablet by mouth 2 times daily for 10 days     Dispense:  20 tablet     Refill:  0    guaiFENesin (MUCINEX) 600 MG extended release tablet     Sig: Take 1 tablet by mouth 2 times daily for 15 days     Dispense:  30 tablet     Refill:  0       Patient Education:  plan    Return if symptoms worsen or fail to improve.

## 2023-01-24 ENCOUNTER — OFFICE VISIT (OUTPATIENT)
Dept: FAMILY MEDICINE CLINIC | Age: 63
End: 2023-01-24

## 2023-01-24 ENCOUNTER — HOSPITAL ENCOUNTER (OUTPATIENT)
Dept: GENERAL RADIOLOGY | Age: 63
Discharge: HOME | End: 2023-01-24
Payer: MEDICARE

## 2023-01-24 VITALS
OXYGEN SATURATION: 98 % | RESPIRATION RATE: 16 BRPM | BODY MASS INDEX: 24.64 KG/M2 | WEIGHT: 130.4 LBS | HEART RATE: 88 BPM | SYSTOLIC BLOOD PRESSURE: 122 MMHG | DIASTOLIC BLOOD PRESSURE: 76 MMHG | TEMPERATURE: 97.5 F

## 2023-01-24 DIAGNOSIS — G89.29 CHRONIC RIGHT HIP PAIN: Primary | ICD-10-CM

## 2023-01-24 DIAGNOSIS — M25.551 CHRONIC RIGHT HIP PAIN: Primary | ICD-10-CM

## 2023-01-24 DIAGNOSIS — G89.29 CHRONIC RIGHT HIP PAIN: ICD-10-CM

## 2023-01-24 DIAGNOSIS — R10.31 SEVERE RIGHT GROIN PAIN: ICD-10-CM

## 2023-01-24 DIAGNOSIS — M25.551 CHRONIC RIGHT HIP PAIN: ICD-10-CM

## 2023-01-24 PROCEDURE — 73502 X-RAY EXAM HIP UNI 2-3 VIEWS: CPT

## 2023-01-24 PROCEDURE — 73552 X-RAY EXAM OF FEMUR 2/>: CPT

## 2023-01-24 RX ORDER — MECLIZINE HYDROCHLORIDE 25 MG/1
TABLET ORAL
COMMUNITY
Start: 2022-12-16

## 2023-01-24 SDOH — ECONOMIC STABILITY: FOOD INSECURITY: WITHIN THE PAST 12 MONTHS, THE FOOD YOU BOUGHT JUST DIDN'T LAST AND YOU DIDN'T HAVE MONEY TO GET MORE.: NEVER TRUE

## 2023-01-24 SDOH — ECONOMIC STABILITY: FOOD INSECURITY: WITHIN THE PAST 12 MONTHS, YOU WORRIED THAT YOUR FOOD WOULD RUN OUT BEFORE YOU GOT MONEY TO BUY MORE.: NEVER TRUE

## 2023-01-24 ASSESSMENT — PATIENT HEALTH QUESTIONNAIRE - PHQ9
2. FEELING DOWN, DEPRESSED OR HOPELESS: 0
SUM OF ALL RESPONSES TO PHQ QUESTIONS 1-9: 0
SUM OF ALL RESPONSES TO PHQ9 QUESTIONS 1 & 2: 0
SUM OF ALL RESPONSES TO PHQ QUESTIONS 1-9: 0
SUM OF ALL RESPONSES TO PHQ QUESTIONS 1-9: 0
1. LITTLE INTEREST OR PLEASURE IN DOING THINGS: 0
SUM OF ALL RESPONSES TO PHQ QUESTIONS 1-9: 0

## 2023-01-24 ASSESSMENT — SOCIAL DETERMINANTS OF HEALTH (SDOH): HOW HARD IS IT FOR YOU TO PAY FOR THE VERY BASICS LIKE FOOD, HOUSING, MEDICAL CARE, AND HEATING?: NOT HARD AT ALL

## 2023-01-24 NOTE — PROGRESS NOTES
1/24/2023    This is a 65 y.o. female   Chief Complaint   Patient presents with    Je Chandler on R side, persistent R hip pain, pain going into pelvis, feels like spasms, saw pain management/chiropractor,      . HPI  Pt presents for right sided hip pain from a fall on her right side in November 2022. Was working in her yard and stepped over her dog and fell, in 2005 had an accident injuring her right side, currently feeling pain in her right groin, had some improvement from her chiropractor, 2 weeks ago groin area was hard and painful. Saw Chiro again a week and a half ago and had adjustments, pain still present in her right groin, painful to raise right leg.        Past Medical History:   Diagnosis Date    COVID-19     Hyperlipidemia     Lupus ([facility] [address] 1960       Past Surgical History:   Procedure Laterality Date    APPENDECTOMY  1984    TONSILLECTOMY      TUBAL LIGATION  1987       Social History     Socioeconomic History    Marital status:      Spouse name: Not on file    Number of children: Not on file    Years of education: Not on file    Highest education level: Not on file   Occupational History    Not on file   Tobacco Use    Smoking status: Every Day     Packs/day: 0.25     Years: 25.00     Pack years: 6.25     Types: Cigarettes    Smokeless tobacco: Never   Vaping Use    Vaping Use: Never used   Substance and Sexual Activity    Alcohol use: Not Currently    Drug use: Yes     Types: Marijuana Cindy Burton)     Comment: gummies    Sexual activity: Yes     Partners: Male   Other Topics Concern    Not on file   Social History Narrative    Not on file     Social Determinants of Health     Financial Resource Strain: Low Risk     Difficulty of Paying Living Expenses: Not hard at all   Food Insecurity: No Food Insecurity    Worried About Running Out of Food in the Last Year: Never true    Ran Out of Food in the Last Year: Never true   Transportation Needs: Not on file   Physical Activity: Not on file   Stress: Not on file   Social Connections: Not on file   Intimate Partner Violence: Not on file   Housing Stability: Not on file       Family History   Problem Relation Age of Onset    Heart Attack Mother     Alzheimer's Disease Father     Heart Disease Father     Atrial Fibrillation Father     Cancer Maternal Grandmother         stomach    Other Maternal Grandfather         tragic injury    Alzheimer's Disease Paternal Grandmother     Cancer Paternal Grandfather         prostate       Current Outpatient Medications   Medication Sig Dispense Refill    meclizine (ANTIVERT) 25 MG tablet TAKE 1 TABLET BY MOUTH THREE TIMES DAILY AS NEEDED      aspirin EC 81 MG EC tablet Take 1 tablet by mouth in the morning. 90 tablet 1    rosuvastatin (CRESTOR) 20 MG tablet Take 1 tablet by mouth in the morning. 90 tablet 3    simvastatin (ZOCOR) 20 MG tablet Take 1 tablet by mouth daily 30 tablet 2    HYDROcodone-acetaminophen (NORCO) 7.5-325 MG per tablet       SUMAtriptan (IMITREX) 100 MG tablet once as needed       ondansetron (ZOFRAN) 8 MG tablet Take 8 mg by mouth as needed       ALPRAZolam (XANAX) 0.5 MG tablet 1.5 mg daily. PREMARIN 0.625 MG/GM vaginal cream       Cholecalciferol (VITAMIN D3) 50 MCG (2000 UT) CAPS Take by mouth      Multiple Vitamins-Minerals (THERAPEUTIC MULTIVITAMIN-MINERALS) tablet Take 1 tablet by mouth daily      Fexofenadine HCl (ALLEGRA PO) Take 180 mg by mouth as needed      pantoprazole sodium (PROTONIX) 40 MG PACK packet Take 40 mg by mouth every morning (before breakfast)      medical marijuana Take by mouth as needed. No current facility-administered medications for this visit. There is no immunization history on file for this patient.     Allergies   Allergen Reactions    Ibuprofen     Methadone     Carisoprodol Nausea And Vomiting       Procedure visit on 08/16/2022   Component Date Value Ref Range Status    Left Ventricular Ejection Fraction 08/16/2022 53   Final-Edited    LVEF MODALITY 08/16/2022 ECHO   Final-Edited       Review of Systems   Musculoskeletal:  Positive for arthralgias and myalgias. /76 (Site: Right Upper Arm, Position: Sitting, Cuff Size: Large Adult)   Pulse 88   Temp 97.5 °F (36.4 °C) (Temporal)   Resp 16   Wt 130 lb 6.4 oz (59.1 kg)   LMP 02/25/1998 (Approximate)   SpO2 98%   BMI 24.64 kg/m²     Physical Exam  Vitals reviewed. Constitutional:       General: She is in acute distress. Musculoskeletal:         General: Tenderness (right trochantor, proximal medial LE) present. Plan   Diagnosis Orders   1. Chronic right hip pain  XR HIP RIGHT (2-3 VIEWS)    XR FEMUR RIGHT (MIN 2 VIEWS)      2. Severe right groin pain  XR HIP RIGHT (2-3 VIEWS)    XR FEMUR RIGHT (MIN 2 VIEWS)        Return in about 2 years (around 1/24/2025) for Xray Hip/Groin Follow-up. Prior to Visit Medications    Medication Sig Taking? Authorizing Provider   meclizine (ANTIVERT) 25 MG tablet TAKE 1 TABLET BY MOUTH THREE TIMES DAILY AS NEEDED Yes Historical Provider, MD   aspirin EC 81 MG EC tablet Take 1 tablet by mouth in the morning. Yes Live Ocampo MD   rosuvastatin (CRESTOR) 20 MG tablet Take 1 tablet by mouth in the morning. Yes Live Ocampo MD   simvastatin (ZOCOR) 20 MG tablet Take 1 tablet by mouth daily Yes ANEL Young - CNP   HYDROcodone-acetaminophen ([place]) 7.5-325 MG per tablet  Yes Historical Provider, MD   SUMAtriptan (IMITREX) 100 MG tablet once as needed  Yes Historical Provider, MD   ondansetron (ZOFRAN) 8 MG tablet Take 8 mg by mouth as needed  Yes Historical Provider, MD   ALPRAZolam Darrol Kamille) 0.5 MG tablet 1.5 mg daily.   Yes Historical Provider, MD   PREMARIN 0.625 MG/GM vaginal cream  Yes Historical Provider, MD   Cholecalciferol (VITAMIN D3) 50 MCG (2000 UT) CAPS Take by mouth Yes Historical Provider, MD   Multiple Vitamins-Minerals (THERAPEUTIC MULTIVITAMIN-MINERALS) tablet Take 1 tablet by mouth daily Yes Historical Provider, MD Fexofenadine HCl (ALLEGRA PO) Take 180 mg by mouth as needed Yes Historical Provider, MD   pantoprazole sodium (PROTONIX) 40 MG PACK packet Take 40 mg by mouth every morning (before breakfast) Yes Historical Provider, MD   medical marijuana Take by mouth as needed.  Yes Historical Provider, MD

## 2023-01-26 DIAGNOSIS — R10.31 SEVERE RIGHT GROIN PAIN: ICD-10-CM

## 2023-01-26 DIAGNOSIS — G89.29 CHRONIC RIGHT HIP PAIN: Primary | ICD-10-CM

## 2023-01-26 DIAGNOSIS — M25.551 CHRONIC RIGHT HIP PAIN: Primary | ICD-10-CM

## 2023-02-08 ENCOUNTER — HOSPITAL ENCOUNTER (OUTPATIENT)
Dept: PHYSICAL THERAPY | Age: 63
Setting detail: THERAPIES SERIES
Discharge: HOME | End: 2023-02-08
Payer: MEDICARE

## 2023-02-08 PROCEDURE — 97110 THERAPEUTIC EXERCISES: CPT

## 2023-02-08 PROCEDURE — 97140 MANUAL THERAPY 1/> REGIONS: CPT

## 2023-02-08 PROCEDURE — 97161 PT EVAL LOW COMPLEX 20 MIN: CPT

## 2023-02-08 PROCEDURE — 97112 NEUROMUSCULAR REEDUCATION: CPT

## 2023-02-08 NOTE — FLOWSHEET NOTE
[address] [place] [address] and [facility], [address] [place] [address] [address]  Phone: [phone]   Fax:     [phone]      Physical Therapy Treatment Note/ Progress Report:     Date:  2023    Patient Name:  Laura Salcedo    :  1960  MRN: [id number]  Restrictions/Precautions:    Medical/Treatment Diagnosis Information:  Diagnosis: Chronic right hip pain M25.551 Severe right groin pain R10.31  Treatment Diagnosis: Chronic right hip pain L83.518  Insurance/Certification information:  PT Insurance Information: BCBS/Medicare  Physician Information:   Terell Ovi  Has the plan of care been signed (Y/N):        []  Yes  [x]  No     Date of Patient follow up with Physician: 23    Is this a Progress Report:     []  Yes  [x]  No      If Yes:  Date Range for reporting period:  Beginnin23 ------------ Ending: 3/8/23    Progress report will be due (10 Rx or 30 days whichever is less):      Recertification will be due (POC Duration  / 90 days whichever is less): 23      Visit # Insurance Allowable Auth Required   In Person 1 Check auth ($40 CP) []  Yes     []  No    Tele Health 0  []  Yes     []  No    Total 1       FOTO Score: LEFS 74%     Date assessed:  23      Latex Allergy:  [x]NO      []YES  Preferred Language for Healthcare:   [x]English       []other:    Pain level:  8-10/10     SUBJECTIVE:  See eval    OBJECTIVE: See eval  Observation:   Test measurements:      RESTRICTIONS/PRECAUTIONS: osteoporosis, lupus    Exercises/Interventions:   Therapeutic Ex (06244) Sets/sec Reps Notes/CUES HEP   Prone knee flex 5-10s 10 vc    Supine hip flexor stretch 5-10s 10 vc    Supine ball squeeze 5s 10 vc    Supine quad sets 5s 10 vc    Supine isometric clam 5s 10 vc    LAQ  5 vc                                       Pt education 10 min  Reviewed HEP with gradual progression, goals of PT, not to push through pain with ex or activity, use of ice- pt stated understanding    Manual Intervention ([phone])       STM R hip flexor, quad, glute 10 min                                         NMR re-education (00457)   CUES NEEDED                                                                   Therapeutic Activity (59087)                                          Worldrat access code: Therapeutic Exercise and NMR EXR  [x] (32872) Provided verbal/tactile cueing for activities related to strengthening, flexibility, endurance, ROM for improvements in LE, proximal hip, and core control with self care, mobility, lifting, ambulation. [x] (21636) Provided verbal/tactile cueing for activities related to improving balance, coordination, kinesthetic sense, posture, motor skill, proprioception to assist with LE, proximal hip, and core control in self-care, mobility, lifting, ambulation and eccentric single leg control.      NMR and Therapeutic Activities:    [x] (96564 or 81790) Provided verbal/tactile cueing for activities related to improving balance, coordination, kinesthetic sense, posture, motor skill, proprioception and motor activation to allow for proper function of core, proximal hip and LE with self-care and ADLs and functional mobility.   [] (09261) Gait Re-education- Provided training and instruction to the patient for proper LE, core and proximal hip recruitment and positioning and eccentric body weight control with ambulation re-education including up and down stairs     Home Exercise Program:    [x] (03719) Reviewed/Progressed HEP activities related to strengthening, flexibility, endurance, ROM of core, proximal hip and LE for functional self-care, mobility, lifting and ambulation/stair navigation   [] (89815) Reviewed/Progressed HEP activities related to improving balance, coordination, kinesthetic sense, posture, motor skill, proprioception of core, proximal hip and LE for self-care, mobility, lifting, and ambulation/stair navigation      Manual Treatments:  PROM / STM / Oscillations-Mobs:  G-I, II, III, IV (PA's, Inf., Post.)  [x] (69591) Provided manual therapy to mobilize LE, proximal hip and/or LS spine soft tissue/joints for the purpose of modulating pain, promoting relaxation, increasing ROM, reducing/eliminating soft tissue swelling/inflammation/restriction, improving soft tissue extensibility and allowing for proper ROM for normal function with self-care, mobility, lifting and ambulation. Modalities:     [] GAME READY (VASO)- for significant edema, swelling, pain control. Charges:  Timed Code Treatment Minutes: 38   Total Treatment Minutes:  48   BWC:  TE TIME:  NMR TIME:  MANUAL TIME:  UNTIMED MINUTES:  Medicare Total:                 [x] EVAL (LOW) 41515 (typically 20 minutes face-to-face)  [] EVAL (MOD) 39168 (typically 30 minutes face-to-face)  [] EVAL (HIGH) 86675 (typically 45 minutes face-to-face)  [] RE-EVAL     [x] JT(61024) x     [] IONTO  [x] NMR (94705) x     [] VASO  [x] Manual (96163) x     [] Other:  [] TA x      [] Mech Traction (74749)  [] ES(attended) (03656)      [] ES (un) (47644):    ASSESSMENT:  See eval    GOALS:     Patient stated goal: Walking with less pain. Therapist goals for Patient:   Short Term Goals: To be achieved in: 2 weeks  1. Independent in HEP and progression per patient tolerance, in order to prevent re-injury. [] Progressing: [] Met: [] Not Met: [] Adjusted     2. Patient will have a decrease in pain to facilitate improvement in movement, function, and ADLs as indicated by Functional Deficits. [] Progressing: [] Met: [] Not Met: [] Adjusted     Long Term Goals: To be achieved in: 12 weeks  1. FOTO score will match or exceed predicted score to assist with reaching prior level of function. [] Progressing: [] Met: [] Not Met: [] Adjusted     2. Patient will demonstrate increased AROM to 110 hip flexor to allow for proper joint functioning as indicated by patients Functional Deficits.    [] Progressing: [] Met: [] Not Met: [] Adjusted     3. Patient will demonstrate an increase in Strength to good proximal hip strength and control, within 5lb HHD in LE to allow for proper functional mobility as indicated by patients Functional Deficits. [] Progressing: [] Met: [] Not Met: [] Adjusted     4. Patient will return to functional activities gardening without increased symptoms or restriction. [] Progressing: [] Met: [] Not Met: [] Adjusted     5. Patient will stand/walk 10-20 mins with minimal increased symptoms (patient specific functional goal)    [] Progressing: [] Met: [] Not Met: [] Adjusted         Overall Progression Towards Functional goals/ Treatment Progress Update:  [] Patient is progressing as expected towards functional goals listed. [] Progression is slowed due to complexities/Impairments listed. [] Progression has been slowed due to co-morbidities.   [x] Plan just implemented, too soon to assess goals progression <30days   [] Goals require adjustment due to lack of progress  [] Patient is not progressing as expected and requires additional follow up with physician  [] Other    Prognosis for POC: [x] Good [] Fair  [] Poor      Patient requires continued skilled intervention: [x] Yes  [] No    Treatment/Activity Tolerance:  [x] Patient able to complete treatment  [] Patient limited by fatigue  [] Patient limited by pain    [] Patient limited by other medical complications  [] Other:     Return to Play: (if applicable)   []  Stage 1: Intro to Strength   []  Stage 2: Return to Run and Strength   []  Stage 3: Return to Jump and Strength   []  Stage 4: Dynamic Strength and Agility   []  Stage 5: Sport Specific Training     []  Ready to Return to Play, Meets All Above Stages   []  Not Ready for Return to Sports   Comments:                          PLAN: See eval  [] Continue per plan of care [] Alter current plan (see comments above)  [x] Plan of care initiated [] Hold pending MD visit [] Discharge    Electronically signed by:  Bernardine Homans, PT    Note: If patient does not return for scheduled/ recommended follow up visits, this note will serve as a discharge from care along with most recent update on progress.
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
## 2023-02-09 ENCOUNTER — OFFICE VISIT (OUTPATIENT)
Dept: FAMILY MEDICINE CLINIC | Age: 63
End: 2023-02-09

## 2023-02-09 VITALS
DIASTOLIC BLOOD PRESSURE: 73 MMHG | OXYGEN SATURATION: 97 % | BODY MASS INDEX: 24.94 KG/M2 | TEMPERATURE: 97.1 F | RESPIRATION RATE: 16 BRPM | HEART RATE: 81 BPM | WEIGHT: 132 LBS | SYSTOLIC BLOOD PRESSURE: 108 MMHG

## 2023-02-09 DIAGNOSIS — M25.551 CHRONIC RIGHT HIP PAIN: Primary | ICD-10-CM

## 2023-02-09 DIAGNOSIS — R10.31 SEVERE RIGHT GROIN PAIN: ICD-10-CM

## 2023-02-09 DIAGNOSIS — G89.29 CHRONIC RIGHT HIP PAIN: Primary | ICD-10-CM

## 2023-02-09 RX ORDER — FAMOTIDINE 40 MG/1
TABLET, FILM COATED ORAL
COMMUNITY
Start: 2023-01-24

## 2023-02-09 SDOH — ECONOMIC STABILITY: FOOD INSECURITY: WITHIN THE PAST 12 MONTHS, YOU WORRIED THAT YOUR FOOD WOULD RUN OUT BEFORE YOU GOT MONEY TO BUY MORE.: NEVER TRUE

## 2023-02-09 SDOH — ECONOMIC STABILITY: INCOME INSECURITY: HOW HARD IS IT FOR YOU TO PAY FOR THE VERY BASICS LIKE FOOD, HOUSING, MEDICAL CARE, AND HEATING?: NOT HARD AT ALL

## 2023-02-09 SDOH — ECONOMIC STABILITY: HOUSING INSECURITY
IN THE LAST 12 MONTHS, WAS THERE A TIME WHEN YOU DID NOT HAVE A STEADY PLACE TO SLEEP OR SLEPT IN A SHELTER (INCLUDING NOW)?: NO

## 2023-02-09 SDOH — ECONOMIC STABILITY: FOOD INSECURITY: WITHIN THE PAST 12 MONTHS, THE FOOD YOU BOUGHT JUST DIDN'T LAST AND YOU DIDN'T HAVE MONEY TO GET MORE.: NEVER TRUE

## 2023-02-09 ASSESSMENT — PATIENT HEALTH QUESTIONNAIRE - PHQ9
1. LITTLE INTEREST OR PLEASURE IN DOING THINGS: 0
SUM OF ALL RESPONSES TO PHQ QUESTIONS 1-9: 0
SUM OF ALL RESPONSES TO PHQ9 QUESTIONS 1 & 2: 0
2. FEELING DOWN, DEPRESSED OR HOPELESS: 0
SUM OF ALL RESPONSES TO PHQ QUESTIONS 1-9: 0

## 2023-02-09 NOTE — PROGRESS NOTES
2/9/2023    This is a 65 y.o. female   Chief Complaint   Patient presents with    Follow-up     After fall Hip/groin pain    . HPI  Pt presents today for f/u for hip/groin pain. 01/24/23 XRays of hip and femur showed mild hip degenerative changes, PT ordered on 01/26/23. States that she is still having pain, no improvement, did PT first session, Dry needling was suggested by PT. Was told that she has very tight muscles in her upper thigh. Past Medical History:   Diagnosis Date    COVID-19     Hyperlipidemia     Lupus ([facility] [address] 1960       Past Surgical History:   Procedure Laterality Date    APPENDECTOMY  1984    TONSILLECTOMY      TUBAL LIGATION  1987       Social History     Socioeconomic History    Marital status:      Spouse name: Not on file    Number of children: Not on file    Years of education: Not on file    Highest education level: Not on file   Occupational History    Not on file   Tobacco Use    Smoking status: Every Day     Packs/day: 0.25     Years: 25.00     Pack years: 6.25     Types: Cigarettes    Smokeless tobacco: Never   Vaping Use    Vaping Use: Never used   Substance and Sexual Activity    Alcohol use: Not Currently    Drug use: Yes     Types: Marijuana [place])     Comment: gummies    Sexual activity: Yes     Partners: Male   Other Topics Concern    Not on file   Social History Narrative    Not on file     Social Determinants of Health     Financial Resource Strain: Low Risk     Difficulty of Paying Living Expenses: Not hard at all   Food Insecurity: No Food Insecurity    Worried About Running Out of Food in the Last Year: Never true    [address] in the Last Year: Never true   Transportation Needs: Unknown    Lack of Transportation (Medical): Not on file    Lack of Transportation (Non-Medical):  No   Physical Activity: Not on file   Stress: Not on file   Social Connections: Not on file   Intimate Partner Violence: Not on file   Housing Stability: Unknown    Unable to Pay for Housing in the Last Year: Not on file    Number of Places Lived in the Last Year: Not on file    Unstable Housing in the Last Year: No       Family History   Problem Relation Age of Onset    Heart Attack Mother     Alzheimer's Disease Father     Heart Disease Father     Atrial Fibrillation Father     Cancer Maternal Grandmother         stomach    Other Maternal Grandfather         tragic injury    Alzheimer's Disease Paternal Grandmother     Cancer Paternal Grandfather         prostate       Current Outpatient Medications   Medication Sig Dispense Refill    famotidine (PEPCID) 40 MG tablet TAKE 1 TABLET BY MOUTH TWICE DAILY      meclizine (ANTIVERT) 25 MG tablet TAKE 1 TABLET BY MOUTH THREE TIMES DAILY AS NEEDED      aspirin EC 81 MG EC tablet Take 1 tablet by mouth in the morning. 90 tablet 1    rosuvastatin (CRESTOR) 20 MG tablet Take 1 tablet by mouth in the morning. 90 tablet 3    HYDROcodone-acetaminophen (NORCO) 7.5-325 MG per tablet       SUMAtriptan (IMITREX) 100 MG tablet once as needed       ondansetron (ZOFRAN) 8 MG tablet Take 8 mg by mouth as needed       ALPRAZolam (XANAX) 0.5 MG tablet 1.5 mg daily. PREMARIN 0.625 MG/GM vaginal cream       Cholecalciferol (VITAMIN D3) 50 MCG (2000 UT) CAPS Take by mouth      Multiple Vitamins-Minerals (THERAPEUTIC MULTIVITAMIN-MINERALS) tablet Take 1 tablet by mouth daily      Fexofenadine HCl (ALLEGRA PO) Take 180 mg by mouth as needed      pantoprazole sodium (PROTONIX) 40 MG PACK packet Take 40 mg by mouth every morning (before breakfast)      medical marijuana Take by mouth as needed. No current facility-administered medications for this visit. There is no immunization history on file for this patient.     Allergies   Allergen Reactions    Ibuprofen     Methadone     Carisoprodol Nausea And Vomiting       Procedure visit on 08/16/2022   Component Date Value Ref Range Status    Left Ventricular Ejection Fraction 08/16/2022 53   Final-Edited LVEF MODALITY 08/16/2022 ECHO   Final-Edited       Review of Systems   Musculoskeletal:  Positive for arthralgias. /73 (Site: Right Upper Arm, Position: Sitting, Cuff Size: Large Adult)   Pulse 81   Temp 97.1 °F (36.2 °C) (Temporal)   Resp 16   Wt 132 lb (59.9 kg)   LMP 02/25/1998 (Approximate)   SpO2 97%   BMI 24.94 kg/m²     Physical Exam  Vitals reviewed. Constitutional:       Appearance: Normal appearance. Plan   Diagnosis Orders   1. Chronic right hip pain  40278 - AR ACUPUNCT W/O ELEC STIMUL 15 MIN      2. Severe right groin pain  96841 - AR ACUPUNCT W/O ELEC STIMUL 15 MIN        Return in about 1 month (around 3/9/2023). Prior to Visit Medications    Medication Sig Taking? Authorizing Provider   famotidine (PEPCID) 40 MG tablet TAKE 1 TABLET BY MOUTH TWICE DAILY Yes Historical Provider, MD   meclizine (ANTIVERT) 25 MG tablet TAKE 1 TABLET BY MOUTH THREE TIMES DAILY AS NEEDED Yes Historical Provider, MD   aspirin EC 81 MG EC tablet Take 1 tablet by mouth in the morning. Yes Janes Wolfe MD   rosuvastatin (CRESTOR) 20 MG tablet Take 1 tablet by mouth in the morning. Yes Janes Wolfe MD   HYDROcodone-acetaminophen ([place]) 7.5-325 MG per tablet  Yes Historical Provider, MD   SUMAtriptan (IMITREX) 100 MG tablet once as needed  Yes Historical Provider, MD   ondansetron (ZOFRAN) 8 MG tablet Take 8 mg by mouth as needed  Yes Historical Provider, MD   ALPRAZolam Asya Stalls) 0.5 MG tablet 1.5 mg daily.   Yes Historical Provider, MD   PREMARIN 0.625 MG/GM vaginal cream  Yes Historical Provider, MD   Cholecalciferol (VITAMIN D3) 50 MCG (2000 UT) CAPS Take by mouth Yes Historical Provider, MD   Multiple Vitamins-Minerals (THERAPEUTIC MULTIVITAMIN-MINERALS) tablet Take 1 tablet by mouth daily Yes Historical Provider, MD   Fexofenadine HCl (ALLEGRA PO) Take 180 mg by mouth as needed Yes Historical Provider, MD   pantoprazole sodium (PROTONIX) 40 MG PACK packet Take 40 mg by mouth every morning (before breakfast) Yes Historical Provider, MD   medical marijuana Take by mouth as needed.  Yes Historical Provider, MD

## 2023-02-15 ENCOUNTER — HOSPITAL ENCOUNTER (OUTPATIENT)
Dept: PHYSICAL THERAPY | Age: 63
Setting detail: THERAPIES SERIES
Discharge: HOME | End: 2023-02-15
Payer: MEDICARE

## 2023-02-15 PROCEDURE — 97110 THERAPEUTIC EXERCISES: CPT

## 2023-02-15 PROCEDURE — 97032 APPL MODALITY 1+ESTIM EA 15: CPT

## 2023-02-15 PROCEDURE — 20560 NDL INSJ W/O NJX 1 OR 2 MUSC: CPT

## 2023-02-15 PROCEDURE — 97140 MANUAL THERAPY 1/> REGIONS: CPT

## 2023-02-15 NOTE — FLOWSHEET NOTE
[facility], [address] [place] [address] [address]  Phone: [phone]   Fax:     [phone]      Physical Therapy Treatment Note/ Progress Report:     Date:  2/15/2023    Patient Name:  John Del Castillo    :  1960  MRN: [id number]  Restrictions/Precautions:    Medical/Treatment Diagnosis Information:  Diagnosis: Chronic right hip pain M25.551 Severe right groin pain R10.31  Treatment Diagnosis: Chronic right hip pain R65.805  Insurance/Certification information:  PT Insurance Information: BCBS/Medicare  Physician Information:   Roberto Yuen  Has the plan of care been signed (Y/N):        [x]  Yes  []  No     Date of Patient follow up with Physician: 23    Is this a Progress Report:     []  Yes  [x]  No      If Yes:  Date Range for reporting period:  Beginnin23 ------------ Ending: 3/8/23    Progress report will be due (10 Rx or 30 days whichever is less): 6/2/15     Recertification will be due (POC Duration  / 90 days whichever is less): 23      Visit # Insurance Allowable Auth Required   In Person 2 6 UNTIL 23 ($40 CP) []  Yes     []  No    Tele Health 0  []  Yes     []  No    Total 2       FOTO Score: LEFS 74%     Date assessed:  23      Latex Allergy:  [x]NO      []YES  Preferred Language for Healthcare:   [x]English       []other:    Pain level:  8-10/10     SUBJECTIVE:   Pt stated tried do ex x3 days after LV felt that pain increased so took a couple days off.  Stated talked     OBJECTIVE: See eval  Observation:   Test measurements:      RESTRICTIONS/PRECAUTIONS: osteoporosis, lupus    Exercises/Interventions:   Therapeutic Ex (40647) Sets/sec Reps Notes/CUES HEP   Prone knee flex 5-10s 10 vc    Supine hip flexor stretch 5-10s 10 vc    Supine ball squeeze 5s 10 vc    Supine quad sets 5s 10 vc    Supine isometric clam 5s 10 vc    LAQ  5 vc    Clam shell  10 vc                                Pt education 10 min  Reviewed HEP with gradual progression, goals of PT, not to push through pain with ex or activity, use of ice- pt stated understanding    Manual Intervention (51701)       STM R hip flexor, quad, glute 15 min      Dn R quad, glute with E-stim att 10 min                                  NMR re-education (06410)   CUES NEEDED                                                                   Therapeutic Activity (10988)                                          [employer] access code: Therapeutic Exercise and NMR EXR  [x] (59133) Provided verbal/tactile cueing for activities related to strengthening, flexibility, endurance, ROM for improvements in LE, proximal hip, and core control with self care, mobility, lifting, ambulation. [x] (67746) Provided verbal/tactile cueing for activities related to improving balance, coordination, kinesthetic sense, posture, motor skill, proprioception to assist with LE, proximal hip, and core control in self-care, mobility, lifting, ambulation and eccentric single leg control.      NMR and Therapeutic Activities:    [x] (43480 or 20185) Provided verbal/tactile cueing for activities related to improving balance, coordination, kinesthetic sense, posture, motor skill, proprioception and motor activation to allow for proper function of core, proximal hip and LE with self-care and ADLs and functional mobility.   [] (29657) Gait Re-education- Provided training and instruction to the patient for proper LE, core and proximal hip recruitment and positioning and eccentric body weight control with ambulation re-education including up and down stairs     Home Exercise Program:    [x] (24977) Reviewed/Progressed HEP activities related to strengthening, flexibility, endurance, ROM of core, proximal hip and LE for functional self-care, mobility, lifting and ambulation/stair navigation   [] (71406) Reviewed/Progressed HEP activities related to improving balance, coordination, kinesthetic sense, posture, motor skill, proprioception of core, proximal hip and LE for self-care, mobility, lifting, and ambulation/stair navigation      Manual Treatments:  PROM / STM / Oscillations-Mobs:  G-I, II, III, IV (PA's, Inf., Post.)  [x] (30418) Provided manual therapy to mobilize LE, proximal hip and/or LS spine soft tissue/joints for the purpose of modulating pain, promoting relaxation, increasing ROM, reducing/eliminating soft tissue swelling/inflammation/restriction, improving soft tissue extensibility and allowing for proper ROM for normal function with self-care, mobility, lifting and ambulation. Modalities:     [] GAME READY (VASO)- for significant edema, swelling, pain control. Dry needling manual therapy: consisted on the placement of 12 needles in the following muscles:  R quad, glutes in sidelying. A 50-60 mm needle was inserted, piston, rotated, and coned to produce intramuscular mobilization. These techniques were used to restore functional range of motion, reduce muscle spasm and induce healing in the corresponding musculature. (35696)  Clean Technique was utilized today while applying Dry needling treatment. The treatment sites where cleaned with 70% solution of  isopropyl alcohol . The PT washed their hands and utilized treatment gloves along with hand  prior to inserting the needles. All needles where removed and discarded in the appropriate sharps container.         Charges:  Timed Code Treatment Minutes: 30   Total Treatment Minutes:  45   BWC:  TE TIME:  NMR TIME:  MANUAL TIME:  UNTIMED MINUTES:  Medicare Total:                 [] EVAL (LOW) 00784 (typically 20 minutes face-to-face)  [] EVAL (MOD) 79137 (typically 30 minutes face-to-face)  [] EVAL (HIGH) 10205 (typically 45 minutes face-to-face)  [] RE-EVAL     [x] OD(30378) x     [] IONTO  [] NMR (75091) x     [] VASO  [x] Manual (38335) x     [x] Other: dn  [] TA x      [] Mech Traction (40133)  [x] ES(attended) (10761) [] ES (un) (26173):    ASSESSMENT:   Good tolerance of dn with mt. No increased symptoms no adverse events noted. Pt stated mild soreness. Reassess NV. Educated on use of heat/stretches to reduce mm soreness. GOALS:     Patient stated goal: Walking with less pain. Therapist goals for Patient:   Short Term Goals: To be achieved in: 2 weeks  1. Independent in HEP and progression per patient tolerance, in order to prevent re-injury. [] Progressing: [] Met: [] Not Met: [] Adjusted     2. Patient will have a decrease in pain to facilitate improvement in movement, function, and ADLs as indicated by Functional Deficits. [] Progressing: [] Met: [] Not Met: [] Adjusted     Long Term Goals: To be achieved in: 12 weeks  1. FOTO score will match or exceed predicted score to assist with reaching prior level of function. [] Progressing: [] Met: [] Not Met: [] Adjusted     2. Patient will demonstrate increased AROM to 110 hip flexor to allow for proper joint functioning as indicated by patients Functional Deficits. [] Progressing: [] Met: [] Not Met: [] Adjusted     3. Patient will demonstrate an increase in Strength to good proximal hip strength and control, within 5lb HHD in LE to allow for proper functional mobility as indicated by patients Functional Deficits. [] Progressing: [] Met: [] Not Met: [] Adjusted     4. Patient will return to functional activities gardening without increased symptoms or restriction. [] Progressing: [] Met: [] Not Met: [] Adjusted     5. Patient will stand/walk 10-20 mins with minimal increased symptoms (patient specific functional goal)    [] Progressing: [] Met: [] Not Met: [] Adjusted         Overall Progression Towards Functional goals/ Treatment Progress Update:  [] Patient is progressing as expected towards functional goals listed. [] Progression is slowed due to complexities/Impairments listed. [] Progression has been slowed due to co-morbidities.   [x] Plan just implemented, too soon to assess goals progression <30days   [] Goals require adjustment due to lack of progress  [] Patient is not progressing as expected and requires additional follow up with physician  [] Other    Prognosis for POC: [x] Good [] Fair  [] Poor      Patient requires continued skilled intervention: [x] Yes  [] No    Treatment/Activity Tolerance:  [x] Patient able to complete treatment  [] Patient limited by fatigue  [] Patient limited by pain    [] Patient limited by other medical complications  [] Other:     Return to Play: (if applicable)   []  Stage 1: Intro to Strength   []  Stage 2: Return to Run and Strength   []  Stage 3: Return to Jump and Strength   []  Stage 4: Dynamic Strength and Agility   []  Stage 5: Sport Specific Training     []  Ready to Return to Play, Meets All Above Stages   []  Not Ready for Return to Sports   Comments:                          PLAN: See eval  [x] Continue per plan of care [] Alter current plan (see comments above)  [] Plan of care initiated [] Hold pending MD visit [] Discharge    Electronically signed by:  Alie Serrano PT    Note: If patient does not return for scheduled/ recommended follow up visits, this note will serve as a discharge from care along with most recent update on progress.

## 2023-02-17 ENCOUNTER — TELEPHONE (OUTPATIENT)
Dept: FAMILY MEDICINE CLINIC | Age: 63
End: 2023-02-17

## 2023-02-17 DIAGNOSIS — M25.551 CHRONIC RIGHT HIP PAIN: Primary | ICD-10-CM

## 2023-02-17 DIAGNOSIS — G89.29 CHRONIC RIGHT HIP PAIN: Primary | ICD-10-CM

## 2023-02-17 DIAGNOSIS — R10.31 SEVERE RIGHT GROIN PAIN: ICD-10-CM

## 2023-02-17 NOTE — TELEPHONE ENCOUNTER
Patient Rosa Stoll has gone to physical therapy and Wednesday patient tried acupuncture.   She is not getting any better and would like to know  thoughts on her having a MRI as discuss on her last ov on 2-9-23 .    2/9/2023  last appt  Future Appointments   Date Time Provider Nichole Jean   2/22/2023 10:00 AM Mitchell Jacobs, PT [facility] EG PT Shannan CAMPOS   3/9/2023  9:30 AM DO MIYA Calero - EMILIANO   5/3/2023  9:30 AM DO MIYA Calero

## 2023-02-22 ENCOUNTER — HOSPITAL ENCOUNTER (OUTPATIENT)
Dept: MRI IMAGING | Age: 63
Discharge: HOME | End: 2023-02-22
Payer: MEDICARE

## 2023-02-22 ENCOUNTER — HOSPITAL ENCOUNTER (OUTPATIENT)
Dept: PHYSICAL THERAPY | Age: 63
Setting detail: THERAPIES SERIES
Discharge: HOME | End: 2023-02-22
Payer: MEDICARE

## 2023-02-22 DIAGNOSIS — R10.31 SEVERE RIGHT GROIN PAIN: ICD-10-CM

## 2023-02-22 DIAGNOSIS — G89.29 CHRONIC RIGHT HIP PAIN: ICD-10-CM

## 2023-02-22 DIAGNOSIS — M25.551 CHRONIC RIGHT HIP PAIN: ICD-10-CM

## 2023-02-22 PROCEDURE — 97110 THERAPEUTIC EXERCISES: CPT

## 2023-02-22 PROCEDURE — 97032 APPL MODALITY 1+ESTIM EA 15: CPT

## 2023-02-22 PROCEDURE — 20560 NDL INSJ W/O NJX 1 OR 2 MUSC: CPT

## 2023-02-22 PROCEDURE — 97140 MANUAL THERAPY 1/> REGIONS: CPT

## 2023-02-22 PROCEDURE — 73721 MRI JNT OF LWR EXTRE W/O DYE: CPT

## 2023-02-22 NOTE — FLOWSHEET NOTE
[facility], [address] [place] [address]  Phone: [phone]   Fax:     [phone]      Physical Therapy Treatment Note/ Progress Report:     Date:  2023    Patient Name:  Lady Au    :  1960  MRN: [id number]  Restrictions/Precautions:    Medical/Treatment Diagnosis Information:  Diagnosis: Chronic right hip pain M25.551 Severe right groin pain R10.31  Treatment Diagnosis: Chronic right hip pain M25.551  Insurance/Certification information:  PT Insurance Information: BCBS/Medicare  Physician Information:   Shereen  Has the plan of care been signed (Y/N):        [x]  Yes  []  No     Date of Patient follow up with Physician: 23    Is this a Progress Report:     []  Yes  [x]  No      If Yes:  Date Range for reporting period:  Beginnin23 ------------ Ending: 3/8/23    Progress report will be due (10 Rx or 30 days whichever is less): 3/8/23     Recertification will be due (POC Duration  / 90 days whichever is less): 23      Visit # Insurance Allowable Auth Required   In Person 3 6 UNTIL 23 ($40 CP) []  Yes     []  No    [facility] 0  []  Yes     []  No    Total 3       FOTO Score: LEFS 74%     Date assessed:  23      Latex Allergy:  [x]NO      []YES  Preferred Language for Healthcare:   [x]English       []other:    Pain level:  8/10     SUBJECTIVE:   Pt denies increased soreness following LV. Stated yesterday was a good day and today feels like soreness has increased some.     OBJECTIVE: See eval  Observation:   Test measurements:      RESTRICTIONS/PRECAUTIONS: osteoporosis, lupus    Exercises/Interventions:   Therapeutic Ex (34949) Sets/sec Reps Notes/CUES HEP   Prone knee flex 5-10s 10 vc    Supine hip flexor stretch 5-10s 10 vc    Supine ball squeeze 5s 10 vc    Supine quad sets 5s 10 vc    Supine isometric clam 5s 10 vc    LAQ  5 vc    Clam shell 2 10 vc    SLR  10 vc                 Pt education 10 min  Reviewed HEP with gradual progression, goals of PT, not to push through pain with ex or activity, use of ice- pt stated understanding    Manual Intervention (57211)       STM R hip flexor, quad, glute 15 min      Dn R quad, glute with E-stim att 10 min                                  NMR re-education (20230)   CUES NEEDED                                                                   Therapeutic Activity (90230)                                          [employer] access code: Therapeutic Exercise and NMR EXR  [x] (90979) Provided verbal/tactile cueing for activities related to strengthening, flexibility, endurance, ROM for improvements in LE, proximal hip, and core control with self care, mobility, lifting, ambulation. [x] (57117) Provided verbal/tactile cueing for activities related to improving balance, coordination, kinesthetic sense, posture, motor skill, proprioception to assist with LE, proximal hip, and core control in self-care, mobility, lifting, ambulation and eccentric single leg control.      NMR and Therapeutic Activities:    [x] (38564 or 06585) Provided verbal/tactile cueing for activities related to improving balance, coordination, kinesthetic sense, posture, motor skill, proprioception and motor activation to allow for proper function of core, proximal hip and LE with self-care and ADLs and functional mobility.   [] (13950) Gait Re-education- Provided training and instruction to the patient for proper LE, core and proximal hip recruitment and positioning and eccentric body weight control with ambulation re-education including up and down stairs     Home Exercise Program:    [x] (30479) Reviewed/Progressed HEP activities related to strengthening, flexibility, endurance, ROM of core, proximal hip and LE for functional self-care, mobility, lifting and ambulation/stair navigation   [] (00372) Reviewed/Progressed HEP activities related to improving balance, coordination, kinesthetic sense, posture, motor skill, proprioception of core, proximal hip and LE for self-care, mobility, lifting, and ambulation/stair navigation      Manual Treatments:  PROM / STM / Oscillations-Mobs:  G-I, II, III, IV (PA's, Inf., Post.)  [x] (69943) Provided manual therapy to mobilize LE, proximal hip and/or LS spine soft tissue/joints for the purpose of modulating pain, promoting relaxation, increasing ROM, reducing/eliminating soft tissue swelling/inflammation/restriction, improving soft tissue extensibility and allowing for proper ROM for normal function with self-care, mobility, lifting and ambulation. Modalities:     [] GAME READY (VASO)- for significant edema, swelling, pain control. Dry needling manual therapy: consisted on the placement of 12 needles in the following muscles:  R quad, glutes in sidelying. A 50-60 mm needle was inserted, piston, rotated, and coned to produce intramuscular mobilization. These techniques were used to restore functional range of motion, reduce muscle spasm and induce healing in the corresponding musculature. (01813)  Clean Technique was utilized today while applying Dry needling treatment. The treatment sites where cleaned with 70% solution of  isopropyl alcohol . The PT washed their hands and utilized treatment gloves along with hand  prior to inserting the needles. All needles where removed and discarded in the appropriate sharps container.         Charges:  Timed Code Treatment Minutes: 25   Total Treatment Minutes:  40   BWC:  TE TIME:  NMR TIME:  MANUAL TIME:  UNTIMED MINUTES:  Medicare Total:                 [] EVAL (LOW) 59863 (typically 20 minutes face-to-face)  [] EVAL (MOD) 85239 (typically 30 minutes face-to-face)  [] EVAL (HIGH) 01219 (typically 45 minutes face-to-face)  [] RE-EVAL     [x] UM(25503) x     [] IONTO  [] NMR (79685) x     [] VASO  [x] Manual (45298) x     [x] Other: dn  [] TA x      [] Mech Traction (88475)  [x] ES(attended) (14871)      [] ES (un) (20223):    ASSESSMENT:   Good tolerance of dn with mt. No increased symptoms no adverse events noted. Decreased tenderness in R hip mm following. Pt stated mild soreness. Reassess NV. Educated on use of heat/stretches to reduce mm soreness. GOALS:     Patient stated goal: Walking with less pain. Therapist goals for Patient:   Short Term Goals: To be achieved in: 2 weeks  1. Independent in HEP and progression per patient tolerance, in order to prevent re-injury. [] Progressing: [] Met: [] Not Met: [] Adjusted     2. Patient will have a decrease in pain to facilitate improvement in movement, function, and ADLs as indicated by Functional Deficits. [] Progressing: [] Met: [] Not Met: [] Adjusted     Long Term Goals: To be achieved in: 12 weeks  1. FOTO score will match or exceed predicted score to assist with reaching prior level of function. [] Progressing: [] Met: [] Not Met: [] Adjusted     2. Patient will demonstrate increased AROM to 110 hip flexor to allow for proper joint functioning as indicated by patients Functional Deficits. [] Progressing: [] Met: [] Not Met: [] Adjusted     3. Patient will demonstrate an increase in Strength to good proximal hip strength and control, within 5lb HHD in LE to allow for proper functional mobility as indicated by patients Functional Deficits. [] Progressing: [] Met: [] Not Met: [] Adjusted     4. Patient will return to functional activities gardening without increased symptoms or restriction. [] Progressing: [] Met: [] Not Met: [] Adjusted     5. Patient will stand/walk 10-20 mins with minimal increased symptoms (patient specific functional goal)    [] Progressing: [] Met: [] Not Met: [] Adjusted         Overall Progression Towards Functional goals/ Treatment Progress Update:  [] Patient is progressing as expected towards functional goals listed. [] Progression is slowed due to complexities/Impairments listed.   [] Progression has been slowed due to co-morbidities. [x] Plan just implemented, too soon to assess goals progression <30days   [] Goals require adjustment due to lack of progress  [] Patient is not progressing as expected and requires additional follow up with physician  [] Other    Prognosis for POC: [x] Good [] Fair  [] Poor      Patient requires continued skilled intervention: [x] Yes  [] No    Treatment/Activity Tolerance:  [x] Patient able to complete treatment  [] Patient limited by fatigue  [] Patient limited by pain    [] Patient limited by other medical complications  [] Other:     Return to Play: (if applicable)   []  Stage 1: Intro to Strength   []  Stage 2: Return to Run and Strength   []  Stage 3: Return to Jump and Strength   []  Stage 4: Dynamic Strength and Agility   []  Stage 5: Sport Specific Training     []  Ready to Return to Play, Meets All Above Stages   []  Not Ready for Return to Sports   Comments:                          PLAN: See eval  [x] Continue per plan of care [] Alter current plan (see comments above)  [] Plan of care initiated [] Hold pending MD visit [] Discharge    Electronically signed by:  Lisset Corbin PT    Note: If patient does not return for scheduled/ recommended follow up visits, this note will serve as a discharge from care along with most recent update on progress.

## 2023-02-23 ENCOUNTER — TELEPHONE (OUTPATIENT)
Dept: FAMILY MEDICINE CLINIC | Age: 63
End: 2023-02-23

## 2023-02-27 NOTE — TELEPHONE ENCOUNTER
Please let pt know that the MRI showed bone spurring at the site where the femur attaches to the hip, cysts in the same area and on the femoral head, no fracture or dislocation, a moderate amount of fluid in the hip joint, irregular appearing cartilage of the hip joint, no bursitis,, and positive for inflammation of the tendons of the gluteus muscles (buttock). Thank you.

## 2023-02-27 NOTE — TELEPHONE ENCOUNTER
Patient called again stated she is needing MRI results because she is needing for therapy . Patient is asking is not available another provider look at results and call her back .

## 2023-03-01 ENCOUNTER — HOSPITAL ENCOUNTER (OUTPATIENT)
Dept: PHYSICAL THERAPY | Age: 63
Setting detail: THERAPIES SERIES
Discharge: HOME | End: 2023-03-01
Payer: MEDICARE

## 2023-03-01 PROCEDURE — 20560 NDL INSJ W/O NJX 1 OR 2 MUSC: CPT

## 2023-03-01 PROCEDURE — 97140 MANUAL THERAPY 1/> REGIONS: CPT

## 2023-03-01 PROCEDURE — 97110 THERAPEUTIC EXERCISES: CPT

## 2023-03-01 PROCEDURE — 97032 APPL MODALITY 1+ESTIM EA 15: CPT

## 2023-03-01 NOTE — FLOWSHEET NOTE
[address] [facility], [address] [place] [address] [address]  Phone: [phone]   Fax:     [phone]      Physical Therapy Treatment Note/ Progress Report:     Date:  3/1/2023    Patient Name:  Trina Callahan    :  1960  MRN: [id number]  Restrictions/Precautions:    Medical/Treatment Diagnosis Information:  Diagnosis: Chronic right hip pain M25.551 Severe right groin pain R10.31  Treatment Diagnosis: Chronic right hip pain W80.328  Insurance/Certification information:  PT Insurance Information: BCBS/Medicare  Physician Information:   Jose Cross  Has the plan of care been signed (Y/N):        [x]  Yes  []  No     Date of Patient follow up with Physician: 23    Is this a Progress Report:     []  Yes  [x]  No      If Yes:  Date Range for reporting period:  Beginnin23 ------------ Ending: 3/8/23    Progress report will be due (10 Rx or 30 days whichever is less): 87     Recertification will be due (POC Duration  / 90 days whichever is less): 23      Visit # Insurance Allowable Auth Required   In Person 4 6 UNTIL 23 ($40 CP) []  Yes     []  No    [facility] 0  []  Yes     []  No    Total 4       FOTO Score: LEFS 74%     Date assessed:  23      Latex Allergy:  [x]NO      []YES  Preferred Language for Healthcare:   [x]English       []other:    Pain level:  5-8/10     SUBJECTIVE:   Pt denies increased soreness following LV. Stated yesterday was a good day and today feels like soreness has increased some.      OBJECTIVE: See eval  Observation:   Test measurements:      RESTRICTIONS/PRECAUTIONS: osteoporosis, lupus    Exercises/Interventions:   Therapeutic Ex (67177) Sets/sec Reps Notes/CUES HEP   Prone knee flex 5-10s 10 vc    Supine hip flexor stretch 5-10s 10 vc    Supine ball squeeze 5s 10 vc    Supine quad sets 5s 10 vc    Supine isometric clam 5s 10 vc    LAQ  5 vc    Clam shell 2 10 vc    SLR  10 vc Pt education 10 min  Reviewed HEP with gradual progression, goals of PT, not to push through pain with ex or activity, use of ice- pt stated understanding    Manual Intervention (25259)       STM R hip flexor, quad, glute 15 min      Dn R quad, glute with E-stim att 10 min                                  NMR re-education (27271)   CUES NEEDED                                                                   Therapeutic Activity (00863)                                          [facility] access code: Therapeutic Exercise and NMR EXR  [x] (92014) Provided verbal/tactile cueing for activities related to strengthening, flexibility, endurance, ROM for improvements in LE, proximal hip, and core control with self care, mobility, lifting, ambulation. [x] (84137) Provided verbal/tactile cueing for activities related to improving balance, coordination, kinesthetic sense, posture, motor skill, proprioception to assist with LE, proximal hip, and core control in self-care, mobility, lifting, ambulation and eccentric single leg control.      NMR and Therapeutic Activities:    [x] (86700 or 66374) Provided verbal/tactile cueing for activities related to improving balance, coordination, kinesthetic sense, posture, motor skill, proprioception and motor activation to allow for proper function of core, proximal hip and LE with self-care and ADLs and functional mobility.   [] (99483) Gait Re-education- Provided training and instruction to the patient for proper LE, core and proximal hip recruitment and positioning and eccentric body weight control with ambulation re-education including up and down stairs     Home Exercise Program:    [x] (67831) Reviewed/Progressed HEP activities related to strengthening, flexibility, endurance, ROM of core, proximal hip and LE for functional self-care, mobility, lifting and ambulation/stair navigation   [] (22707) Reviewed/Progressed HEP activities related to improving balance, coordination, kinesthetic sense, posture, motor skill, proprioception of core, proximal hip and LE for self-care, mobility, lifting, and ambulation/stair navigation      Manual Treatments:  PROM / STM / Oscillations-Mobs:  G-I, II, III, IV (PA's, Inf., Post.)  [x] (63505) Provided manual therapy to mobilize LE, proximal hip and/or LS spine soft tissue/joints for the purpose of modulating pain, promoting relaxation, increasing ROM, reducing/eliminating soft tissue swelling/inflammation/restriction, improving soft tissue extensibility and allowing for proper ROM for normal function with self-care, mobility, lifting and ambulation. Modalities:     [] GAME READY (VASO)- for significant edema, swelling, pain control. Dry needling manual therapy: consisted on the placement of 12 needles in the following muscles:  R quad, glutes in sidelying. A 50-60 mm needle was inserted, piston, rotated, and coned to produce intramuscular mobilization. These techniques were used to restore functional range of motion, reduce muscle spasm and induce healing in the corresponding musculature. (95117)  Clean Technique was utilized today while applying Dry needling treatment. The treatment sites where cleaned with 70% solution of  isopropyl alcohol . The PT washed their hands and utilized treatment gloves along with hand  prior to inserting the needles. All needles where removed and discarded in the appropriate sharps container.         Charges:  Timed Code Treatment Minutes: 25   Total Treatment Minutes:  40   BWC:  TE TIME:  NMR TIME:  MANUAL TIME:  UNTIMED MINUTES:  Medicare Total:                 [] EVAL (LOW) 99240 (typically 20 minutes face-to-face)  [] EVAL (MOD) 01571 (typically 30 minutes face-to-face)  [] EVAL (HIGH) 58581 (typically 45 minutes face-to-face)  [] RE-EVAL     [x] NW(28099) x     [] IONTO  [] NMR (36909) x     [] VASO  [x] Manual (36484) x     [x] Other: dn  [] TA x      [] Mech Traction (56643)  [x] ES(attended) (41293)      [] ES (un) (98418):    ASSESSMENT:   Good tolerance of dn with mt. No increased symptoms no adverse events noted. Decreased tenderness in R hip mm following. Pt stated mild soreness. Reassess NV. Educated on use of heat/stretches to reduce mm soreness. Pt stated overall does feel some improvement with the muscle soreness however does not feel that the pain deep in her hip is improving stated feels \"jammed\". GOALS:     Patient stated goal: Walking with less pain. Therapist goals for Patient:   Short Term Goals: To be achieved in: 2 weeks  1. Independent in HEP and progression per patient tolerance, in order to prevent re-injury. [] Progressing: [] Met: [] Not Met: [] Adjusted     2. Patient will have a decrease in pain to facilitate improvement in movement, function, and ADLs as indicated by Functional Deficits. [] Progressing: [] Met: [] Not Met: [] Adjusted     Long Term Goals: To be achieved in: 12 weeks  1. FOTO score will match or exceed predicted score to assist with reaching prior level of function. [] Progressing: [] Met: [] Not Met: [] Adjusted     2. Patient will demonstrate increased AROM to 110 hip flexor to allow for proper joint functioning as indicated by patients Functional Deficits. [] Progressing: [] Met: [] Not Met: [] Adjusted     3. Patient will demonstrate an increase in Strength to good proximal hip strength and control, within 5lb HHD in LE to allow for proper functional mobility as indicated by patients Functional Deficits. [] Progressing: [] Met: [] Not Met: [] Adjusted     4. Patient will return to functional activities gardening without increased symptoms or restriction. [] Progressing: [] Met: [] Not Met: [] Adjusted     5.  Patient will stand/walk 10-20 mins with minimal increased symptoms (patient specific functional goal)    [] Progressing: [] Met: [] Not Met: [] Adjusted         Overall Progression Towards Functional goals/ Treatment Progress Update:  [] Patient is progressing as expected towards functional goals listed. [] Progression is slowed due to complexities/Impairments listed. [] Progression has been slowed due to co-morbidities. [x] Plan just implemented, too soon to assess goals progression <30days   [] Goals require adjustment due to lack of progress  [] Patient is not progressing as expected and requires additional follow up with physician  [] Other    Prognosis for POC: [x] Good [] Fair  [] Poor      Patient requires continued skilled intervention: [x] Yes  [] No    Treatment/Activity Tolerance:  [x] Patient able to complete treatment  [] Patient limited by fatigue  [] Patient limited by pain    [] Patient limited by other medical complications  [] Other:     Return to Play: (if applicable)   []  Stage 1: Intro to Strength   []  Stage 2: Return to Run and Strength   []  Stage 3: Return to Jump and Strength   []  Stage 4: Dynamic Strength and Agility   []  Stage 5: Sport Specific Training     []  Ready to Return to Play, Meets All Above Stages   []  Not Ready for Return to Sports   Comments:                          PLAN: See eval  [x] Continue per plan of care [] Alter current plan (see comments above)  [] Plan of care initiated [] Hold pending MD visit [] Discharge    Electronically signed by:  Larisa West PT    Note: If patient does not return for scheduled/ recommended follow up visits, this note will serve as a discharge from care along with most recent update on progress.

## 2023-03-09 ENCOUNTER — OFFICE VISIT (OUTPATIENT)
Dept: ORTHOPEDIC SURGERY | Age: 63
End: 2023-03-09

## 2023-03-09 ENCOUNTER — HOSPITAL ENCOUNTER (OUTPATIENT)
Dept: PHYSICAL THERAPY | Age: 63
Setting detail: THERAPIES SERIES
End: 2023-03-09
Payer: MEDICARE

## 2023-03-09 VITALS — BODY MASS INDEX: 24.92 KG/M2 | RESPIRATION RATE: 12 BRPM | WEIGHT: 132 LBS | HEIGHT: 61 IN

## 2023-03-09 DIAGNOSIS — M16.11 PRIMARY OSTEOARTHRITIS OF RIGHT HIP: Primary | ICD-10-CM

## 2023-03-09 NOTE — PROGRESS NOTES
Date:  3/10/2023    Name:  Diana Castrejon  Address:  [address] [place] [id number]    :  1960      Age:   65 y.o.    SSN:  [id number]      Medical Record Number:  [id number]    Reason for Visit:    Chief Complaint    Hip Pain (NP, right hip. States having hip pain since 2022. Feels as though hip is \"out of place\")      DOS:3/9/2023     HPI: Diana Castrejon is a 65 y.o. female here today for  evaluation of right anterior  hip pain that has been on going for month(s). Her symptoms first began in December with no known injury. She feels as though her pain may be linked to a work-related accident which occurred 15 years ago. Pain is localized to the anterior portion of the hip. She describes her pain as being deep in the groin. She qualifies her pain as 5/10, throbbing, sharp with any walking for long periods of time or deep flexion. She has participated in formalized physical therapy for the past 4 weeks with no symptom relief. She has additionally used ice which she states does help some. Pain assessment is documented below. The patient denies any bowel/bladder symptoms, or any numbness, tingling, or weakness down the affected thigh or leg. The patient denies any prior hip injuries, surgeries, or any childhood history of hip disorders. Diana Castrejon is currently not working. 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       Pain Assessment  Location of Pain: Hip  Location Modifiers: Right  Severity of Pain: 5  Quality of Pain: Throbbing, Sharp, Dull, Aching  Duration of Pain: Persistent  Frequency of Pain: Constant  Aggravating Factors: Walking, Standing, Exercise, Bending, Stretching, Straightening  Limiting Behavior: Yes  Relieving Factors: Rest  Result of Injury: Yes  Work-Related Injury: No  Are there other pain locations you wish to document?: No  ROS: Review of systems reviewed from Patient History Form completed today and available in the patient's chart under the Media tab. Past Medical History:   Diagnosis Date    COVID-19     Hyperlipidemia     Lupus ([facility] [address] 1960        Past Surgical History:   Procedure Laterality Date    APPENDECTOMY  1984    TONSILLECTOMY      TUBAL LIGATION  1987       Family History   Problem Relation Age of Onset    Heart Attack Mother     Alzheimer's Disease Father     Heart Disease Father     Atrial Fibrillation Father     Cancer Maternal Grandmother         stomach    Other Maternal Grandfather         tragic injury    Alzheimer's Disease Paternal Grandmother     Cancer Paternal Grandfather         prostate       Social History     Socioeconomic History    Marital status:      Spouse name: None    Number of children: None    Years of education: None    Highest education level: None   Tobacco Use    Smoking status: Every Day     Packs/day: 0.25     Years: 25.00     Pack years: 6.25     Types: Cigarettes    Smokeless tobacco: Never   Vaping Use    Vaping Use: Never used   Substance and Sexual Activity    Alcohol use: Not Currently    Drug use: Yes     Types: Marijuana (Weed)     Comment: gummies    Sexual activity: Yes     Partners: Male     Social Determinants of Health     Financial Resource Strain: Low Risk     Difficulty of Paying Living Expenses: Not hard at all   Food Insecurity: No Food Insecurity    Worried About [facility] in the Last Year: Never true    Ran Out of Food in the Last Year: Never true   Transportation Needs: Unknown    Lack of Transportation (Non-Medical): No   Housing Stability: Unknown    Unstable Housing in the Last Year: No       Current Outpatient Medications   Medication Sig Dispense Refill    famotidine (PEPCID) 40 MG tablet TAKE 1 TABLET BY MOUTH TWICE DAILY      meclizine (ANTIVERT) 25 MG tablet TAKE 1 TABLET BY MOUTH THREE TIMES DAILY AS NEEDED      aspirin EC 81 MG EC tablet Take 1 tablet by mouth in the morning.  90 tablet 1    rosuvastatin (CRESTOR) 20 MG tablet Take 1 tablet by mouth in the morning. 90 tablet 3    HYDROcodone-acetaminophen (NORCO) 7.5-325 MG per tablet       SUMAtriptan (IMITREX) 100 MG tablet once as needed       ondansetron (ZOFRAN) 8 MG tablet Take 8 mg by mouth as needed       ALPRAZolam (XANAX) 0.5 MG tablet 1.5 mg daily.       PREMARIN 0.625 MG/GM vaginal cream       Cholecalciferol (VITAMIN D3) 50 MCG (2000 UT) CAPS Take by mouth      Multiple Vitamins-Minerals (THERAPEUTIC MULTIVITAMIN-MINERALS) tablet Take 1 tablet by mouth daily      Fexofenadine HCl (ALLEGRA PO) Take 180 mg by mouth as needed      pantoprazole sodium (PROTONIX) 40 MG PACK packet Take 40 mg by mouth every morning (before breakfast)      medical marijuana Take by mouth as needed.       No current facility-administered medications for this visit.       Allergies   Allergen Reactions    Ibuprofen     Methadone     Carisoprodol Nausea And Vomiting       Vital signs:  Resp 12   Ht 5' 1\" (1.549 m)   Wt 132 lb (59.9 kg)   LMP 02/25/1998 (Approximate)   BMI 24.94 kg/m²        Constitutional: The physical examination finds the patient to be well-developed and well-nourished.  The patient is alert and oriented x3 and was cooperative throughout the visit.  Neuro: no focal deficits noted. Normal mood, judgement, decision making  Eyes: sclera clear, EOMI  Ears: Normal external ear  Mouth:  No perioral lesions  Pulm: Respirations unlabored and regular  Pulse: Extremities well perfused, warm, capillary refill < 2 seconds  Musculoskeletal:        Hip Examination: right    Skin/Inspection: No skin lesions, cellulitis, or extreme edema in the lower extremities.     Standing/Walking: normal gait, negative Trendelenburg sign.      Supine/Side Lying Exam: Non tender around the major bony prominences.  TTP at hip flexor and IT band  diminished range of motion  Flexion arc 0 to 80 degrees flexion arc, IR 15 degrees, ER 15 degrees   Deep Flexion test Positive  KELLY  Positive  VAIBHAV Negative  Resisted Abduction 5/5   Resisted Adduction 5/5   Resisted  Flexion 5/5  Athletic Pubalgia: Deferred  mild tender at greater trochanter  Leg Length: equal    Prone: Present hip flexion contracture  Non tender to the proximal hamstring   Non tender to the lumbar spine or sacro-iliac joints    Distal Neurovascular exam is intact (foot sensation, pulses, and motor exam)      Beighton Score: Deferred      Diagnostics:  Radiology:       Pertinent imaging reviewed, both images and report. oldRadiographs were obtained and reviewed in the office; 3 views: AP Pelvis and right hip 45-deg Reyes View, and right False Profile View    Tonnis Grade: grade 2  LCEA:  normal  30   Alpha Angle: 45 deg   normal  Cross over-sign is negative  Other:  Negative Coxa Profunda, Negative Protrusio  Impression: No acute findings regarding fractures or dislocations. There is mild to moderate degenerative changes with interlabial calcifications. Bi polar cartilage changes. MRI 2/22/2023  1. Moderate osteoarthritic changes at the right hip. 2. Moderate-sized joint effusion. 3. Gluteus medius and minimus tendinopathy. Assessment: Patient is a 65 y.o. female with physical exam and imaging consistent with diagnosis of degenerative labral tear, CURTIS, and bi polar cartilage lesions to her hip. Impression:  Visit Diagnoses         Codes    Primary osteoarthritis of right hip    -  Primary M16.11               Office Procedures:  No orders of the defined types were placed in this encounter. No orders of the defined types were placed in this encounter. Plan:  Pertinent imaging was reviewed. The etiology, natural history, and treatment options for the disorder were discussed. The roles of activity modification, antiinflammatory medicine, injections, bracing, physical therapy, and surgical interventions were all described to the patient and questions were answered.     We believe patient is a candidate for Future option for a joint injection. It is my suggestion she undergo right hip cortisone injection under ultrasound. She will be scheduled for this at a future date that is convenient for her and myself. All the patient's questions were answered while in the clinic. The patient is understanding of all instructions and agrees with the plan. Approximately 45 minutes was spent on patient education and coordinating care. Follow up in: No follow-ups on file. Hip Self assessment forms      Sincerely,  Cata Goode MD  I, JUNE Conti, am scribing for and in the presence of Cata Goode MD.      Cata Goode  [address] [facility] [address] [place] [address], [address]  Email: [email or web address]  Office: [phone]      03/10/23  2:32 PM    This dictation was performed with a verbal recognition program (DRAGON) and it was checked for errors. It is possible that there are still dictated errors within this office note. If so, please bring any errors to my attention for an addendum. All efforts were made to ensure that this office note is accurate.

## 2023-03-09 NOTE — LETTER
March 10, 2023      DO [address]      Patient: Georgiann Monday   MR Number: [id number]   YOB: 1960   Date of Visit: 3/9/2023       Dear Reji Irving: Thank you for referring Georgiann Monday to me for evaluation/treatment. Below are the relevant portions of my assessment and plan of care. If you have questions, please do not hesitate to call me. I look forward to following Rhonda Padron along with you.     Sincerely,        Coretta Torres MD

## 2023-03-13 ENCOUNTER — OFFICE VISIT (OUTPATIENT)
Dept: ORTHOPEDIC SURGERY | Age: 63
End: 2023-03-13

## 2023-03-13 VITALS — WEIGHT: 132 LBS | HEIGHT: 61 IN | BODY MASS INDEX: 24.92 KG/M2

## 2023-03-13 DIAGNOSIS — M16.11 PRIMARY OSTEOARTHRITIS OF RIGHT HIP: Primary | ICD-10-CM

## 2023-03-13 DIAGNOSIS — M67.951 TENDINOPATHY OF RIGHT GLUTEUS MEDIUS: ICD-10-CM

## 2023-03-13 RX ORDER — METHYLPREDNISOLONE ACETATE 40 MG/ML
80 INJECTION, SUSPENSION INTRA-ARTICULAR; INTRALESIONAL; INTRAMUSCULAR; SOFT TISSUE ONCE
Status: COMPLETED | OUTPATIENT
Start: 2023-03-13 | End: 2023-03-13

## 2023-03-13 RX ORDER — ROPIVACAINE HYDROCHLORIDE 5 MG/ML
8 INJECTION, SOLUTION EPIDURAL; INFILTRATION; PERINEURAL ONCE
Status: CANCELLED | OUTPATIENT
Start: 2023-03-13 | End: 2023-03-13

## 2023-03-13 RX ORDER — ROPIVACAINE HYDROCHLORIDE 5 MG/ML
20 INJECTION, SOLUTION EPIDURAL; INFILTRATION; PERINEURAL ONCE
Status: COMPLETED | OUTPATIENT
Start: 2023-03-13 | End: 2023-03-13

## 2023-03-13 RX ADMIN — METHYLPREDNISOLONE ACETATE 80 MG: 40 INJECTION, SUSPENSION INTRA-ARTICULAR; INTRALESIONAL; INTRAMUSCULAR; SOFT TISSUE at 15:04

## 2023-03-13 RX ADMIN — ROPIVACAINE HYDROCHLORIDE 20 ML: 5 INJECTION, SOLUTION EPIDURAL; INFILTRATION; PERINEURAL at 15:04

## 2023-03-13 NOTE — PROGRESS NOTES
3/13/23  2:59 PM        NDC: 31692-414-27   -   Ropivacaine 0.5 %    LOT: [id number]    COMMENT: RIGHT HIP INTRA-ARTICULAR CORTISONE INJECTION      NDC: 7352-3198-09   -   Depo Medrol 40mg    LOT: [id number]    COMMENT: RIGHT HIP INTRA-ARTICULAR CORTISONE INJECTION

## 2023-03-13 NOTE — PROGRESS NOTES
Date:  3/13/2023    Name:  Erum Syed  Address:  [address] [place] [id number]    :  1960      Age:   65 y.o.    SSN:  [id number]      Medical Record Number:  [id number]    Reason for Visit:    Chief Complaint    Injections (U/S RIGHT HIP IAC)      DOS:3/13/2023     HPI: Erum Syed is a 65 y.o. female here today for  prescheduled right hip IAC for a degenerative labral tear. Pain assessment is documented below. ROS: Review of systems reviewed from Patient History Form completed today and available in the patient's chart under the Media tab.        Past Medical History:   Diagnosis Date    COVID-19     Hyperlipidemia     Lupus ([facility] [address]         Past Surgical History:   Procedure Laterality Date    APPENDECTOMY  1984    TONSILLECTOMY      TUBAL LIGATION  1987       Family History   Problem Relation Age of Onset    Heart Attack Mother     Alzheimer's Disease Father     Heart Disease Father     Atrial Fibrillation Father     Cancer Maternal Grandmother         stomach    Other Maternal Grandfather         tragic injury    Alzheimer's Disease Paternal Grandmother     Cancer Paternal Grandfather         prostate       Social History     Socioeconomic History    Marital status:      Spouse name: None    Number of children: None    Years of education: None    Highest education level: None   Tobacco Use    Smoking status: Every Day     Packs/day: 0.25     Years: 25.00     Pack years: 6.25     Types: Cigarettes    Smokeless tobacco: Never   Vaping Use    Vaping Use: Never used   Substance and Sexual Activity    Alcohol use: Not Currently    Drug use: Yes     Types: Marijuana Jenn Foley     Comment: gummies    Sexual activity: Yes     Partners: Male     Social Determinants of Health     Financial Resource Strain: Low Risk     Difficulty of Paying Living Expenses: Not hard at all   Food Insecurity: No Food Insecurity    Worried About [address] in the Last Year: Never true    Ran Out of Food in the Last Year: Never true   Transportation Needs: Unknown    Lack of Transportation (Non-Medical): No   Housing Stability: Unknown    Unstable Housing in the Last Year: No       Current Outpatient Medications   Medication Sig Dispense Refill    famotidine (PEPCID) 40 MG tablet TAKE 1 TABLET BY MOUTH TWICE DAILY      meclizine (ANTIVERT) 25 MG tablet TAKE 1 TABLET BY MOUTH THREE TIMES DAILY AS NEEDED      aspirin EC 81 MG EC tablet Take 1 tablet by mouth in the morning. 90 tablet 1    rosuvastatin (CRESTOR) 20 MG tablet Take 1 tablet by mouth in the morning. 90 tablet 3    HYDROcodone-acetaminophen (NORCO) 7.5-325 MG per tablet       SUMAtriptan (IMITREX) 100 MG tablet once as needed       ondansetron (ZOFRAN) 8 MG tablet Take 8 mg by mouth as needed       ALPRAZolam (XANAX) 0.5 MG tablet 1.5 mg daily. PREMARIN 0.625 MG/GM vaginal cream       Cholecalciferol (VITAMIN D3) 50 MCG (2000 UT) CAPS Take by mouth      Multiple Vitamins-Minerals (THERAPEUTIC MULTIVITAMIN-MINERALS) tablet Take 1 tablet by mouth daily      Fexofenadine HCl (ALLEGRA PO) Take 180 mg by mouth as needed      pantoprazole sodium (PROTONIX) 40 MG PACK packet Take 40 mg by mouth every morning (before breakfast)      medical marijuana Take by mouth as needed. No current facility-administered medications for this visit. Allergies   Allergen Reactions    Ibuprofen     Methadone     Carisoprodol Nausea And Vomiting       Vital signs:  Ht 5' 1\" (1.549 m)   Wt 132 lb (59.9 kg)   LMP 02/25/1998 (Approximate)   BMI 24.94 kg/m²        Constitutional: The physical examination finds the patient to be well-developed and well-nourished. The patient is alert and oriented x3 and was cooperative throughout the visit. Neuro: no focal deficits noted.  Normal mood, judgement, decision making  Eyes: sclera clear, EOMI  Ears: Normal external ear  Mouth:  No perioral lesions  Pulm: Respirations unlabored and regular  Pulse: Extremities well perfused, warm, capillary refill < 2 seconds  Musculoskeletal:        Hip Examination: right    Skin/Inspection: No skin lesions, cellulitis, or extreme edema in the lower extremities. Standing/Walking: normal gait, negative Trendelenburg sign. Supine/Side Lying Exam: Non tender around the major bony prominences. TTP at hip flexor and IT band  diminished range of motion  Flexion arc 0 to 80 degrees flexion arc, IR 15 degrees, ER 15 degrees   Deep Flexion test Positive  FADIR Positive  VAIBHAV Negative  Resisted Abduction 5/5   Resisted Adduction 5/5   Resisted  Flexion 5/5  Athletic Pubalgia: Deferred  mild tender at greater trochanter  Leg Length: equal    Prone: Present hip flexion contracture  Non tender to the proximal hamstring   Non tender to the lumbar spine or sacro-iliac joints    Distal Neurovascular exam is intact (foot sensation, pulses, and motor exam)      Beighton Score: Deferred      Diagnostics:  Radiology:     No new imaging today. MRI 2/22/2023  1. Moderate osteoarthritic changes at the right hip. 2. Moderate-sized joint effusion. 3. Gluteus medius and minimus tendinopathy. Assessment: Patient is a 65 y.o. female with physical exam and imaging consistent with diagnosis of degenerative labral tear, CURTIS, and bi polar cartilage lesions to her hip. Here today for pre-scheduled right hip IAC with no contraindications to proceeding as scheduled.      Impression:  Visit Diagnoses         Codes    Primary osteoarthritis of right hip    -  Primary M16.11    Tendinopathy of right gluteus medius     M67.951               Office Procedures:  Orders Placed This Encounter   Medications    ropivacaine (NAROPIN) 0.5% injection 20 mL    methylPREDNISolone acetate (DEPO-MEDROL) injection 80 mg       Orders Placed This Encounter   Procedures    US GUIDED NEEDLE PLACEMENT     Standing Status:   Future     Number of Occurrences:   1     Standing Expiration Date:   3/13/2024    MS ARTHROCENTESIS ASPIR&/INJ MAJOR JT/BURSA W/O US         Plan:  Colton Bourgeois remains a good candidate for right hip IAC as described in the procedure note below. The patient was given the option of performing today's injection using ultrasound guidance. We discussed the pros and cons of using the ultrasound for guidance. The patient chose to proceed, and today's injection was performed using Logic E ultrasound unit with a variable frequency (10 MHz) linear transducer for localization and needle placement. The image was saved for the medical record. Procedure (right intra-articular hip joint injection): The indications and risks of steroid injection as well as treatment alternatives were discussed with the patient who consented to the procedure. Under sterile conditions and after informed consent was obtained the patient was given an injection into the right hip joint with ultrasound guidance. Using a 20 gauge needle, 2cc  of 40 mg/mL of Depo-Medrol and 4 mL of 0.5% ropivacaine (Naropin) were placed in the hip after it was prepped with chlorhexidine and needle localization was confirmed on ultrasound. This resulted in good relief of symptoms, and flexion/rotation tests of the involved hip shortly after the injection were less provocative. There were no complications. The patient was advised to ice the hip  this evening and to avoid vigorous activities for the next 2 days. They were advised to call us if there was any erythema, enduration, swelling or increasing pain. All the patient's questions were answered while in the clinic. The patient is understanding of all instructions and agrees with the plan. Approximately 45 minutes was spent on patient education and coordinating care. Follow up in: Return in about 4 weeks (around 4/10/2023).       Sincerely,  [employer], [employer], [facility], am scribing for and in the presence of Feroz Garcia MD.      Feroz Garcia MD [facility]  Orthopaedic Surgeon - Hip Preservation & Sports Medicine   [address]   [address] [facility], [address]  Email: [email or web address] Takes  Office: [phone]      03/13/23  4:40 PM  The encounter with Joby Coronado was carried out by myself, Dr Bennett Pace, who personally examined the patient and reviewed the plan. This dictation was performed with a verbal recognition program (DRAGON) and it was checked for errors. It is possible that there are still dictated errors within this office note. If so, please bring any errors to my attention for an addendum. All efforts were made to ensure that this office note is accurate.

## 2023-03-13 NOTE — LETTER
March 14, 2023      DO [address]      Patient: Brent Vickers   MR Number: [id number]   YOB: 1960   Date of Visit: 3/13/2023       Dear Linnette Jackson: Thank you for referring Brent Vickers to me for evaluation/treatment. Below are the relevant portions of my assessment and plan of care. If you have questions, please do not hesitate to call me. I look forward to following Merary Tran along with you.     Sincerely,        Jefferson Dodd MD

## 2023-03-22 ENCOUNTER — HOSPITAL ENCOUNTER (OUTPATIENT)
Dept: PHYSICAL THERAPY | Age: 63
Setting detail: THERAPIES SERIES
Discharge: HOME | End: 2023-03-22
Payer: MEDICARE

## 2023-03-22 PROCEDURE — 97032 APPL MODALITY 1+ESTIM EA 15: CPT

## 2023-03-22 PROCEDURE — 20560 NDL INSJ W/O NJX 1 OR 2 MUSC: CPT

## 2023-03-22 PROCEDURE — 97140 MANUAL THERAPY 1/> REGIONS: CPT

## 2023-03-22 PROCEDURE — 97110 THERAPEUTIC EXERCISES: CPT

## 2023-03-22 NOTE — PROGRESS NOTES
[x]English       []other:    Pain level:  5-8/10     SUBJECTIVE:   Pt saw MD, got injection. Stated then went to pain management doctor. Planning to get ultrasound next week and f/u with MD.    OBJECTIVE: See eval  Observation:   Test measurements:      RESTRICTIONS/PRECAUTIONS: osteoporosis, lupus    Exercises/Interventions:   Therapeutic Ex (72076) Sets/sec Reps Notes/CUES HEP   Prone knee flex 5-10s 10 vc    Supine hip flexor stretch 5-10s 10 vc    Supine ball squeeze 5s 10 vc    Supine quad sets 5s 10 vc    Supine isometric clam 5s 10 vc    LAQ  5 vc    Clam shell 2 10 vc    SLR  10 vc                         Pt education 10 min  Reviewed HEP with gradual progression, goals of PT, not to push through pain with ex or activity, use of ice- pt stated understanding    Manual Intervention (72369)       STM R hip flexor, quad, glute 15 min      Dn R quad, glute with E-stim att 10 min                                  NMR re-education (82554)   CUES NEEDED                                                                   Therapeutic Activity (54172)                                          Inofile access code: Therapeutic Exercise and NMR EXR  [x] (04771) Provided verbal/tactile cueing for activities related to strengthening, flexibility, endurance, ROM for improvements in LE, proximal hip, and core control with self care, mobility, lifting, ambulation. [x] (18755) Provided verbal/tactile cueing for activities related to improving balance, coordination, kinesthetic sense, posture, motor skill, proprioception to assist with LE, proximal hip, and core control in self-care, mobility, lifting, ambulation and eccentric single leg control.      NMR and Therapeutic Activities:    [x] (50696 or 93013) Provided verbal/tactile cueing for activities related to improving balance, coordination, kinesthetic sense, posture, motor skill, proprioception and motor activation to allow for proper function of core, proximal

## 2023-05-03 ENCOUNTER — OFFICE VISIT (OUTPATIENT)
Dept: FAMILY MEDICINE CLINIC | Age: 63
End: 2023-05-03
Payer: MEDICARE

## 2023-05-03 VITALS
SYSTOLIC BLOOD PRESSURE: 130 MMHG | BODY MASS INDEX: 25.34 KG/M2 | HEIGHT: 61 IN | HEART RATE: 88 BPM | OXYGEN SATURATION: 99 % | RESPIRATION RATE: 16 BRPM | DIASTOLIC BLOOD PRESSURE: 83 MMHG | TEMPERATURE: 97.1 F | WEIGHT: 134.2 LBS

## 2023-05-03 DIAGNOSIS — R10.31 SEVERE RIGHT GROIN PAIN: ICD-10-CM

## 2023-05-03 DIAGNOSIS — R00.2 PALPITATIONS: ICD-10-CM

## 2023-05-03 DIAGNOSIS — M32.9 LUPUS (HCC): ICD-10-CM

## 2023-05-03 DIAGNOSIS — R73.01 ELEVATED FASTING GLUCOSE: ICD-10-CM

## 2023-05-03 DIAGNOSIS — E78.00 ELEVATED LDL CHOLESTEROL LEVEL: ICD-10-CM

## 2023-05-03 DIAGNOSIS — M25.551 CHRONIC RIGHT HIP PAIN: ICD-10-CM

## 2023-05-03 DIAGNOSIS — Z00.00 MEDICARE ANNUAL WELLNESS VISIT, SUBSEQUENT: Primary | ICD-10-CM

## 2023-05-03 DIAGNOSIS — Z12.31 ENCOUNTER FOR SCREENING MAMMOGRAM FOR MALIGNANT NEOPLASM OF BREAST: ICD-10-CM

## 2023-05-03 DIAGNOSIS — G89.29 CHRONIC RIGHT HIP PAIN: ICD-10-CM

## 2023-05-03 PROCEDURE — 99213 OFFICE O/P EST LOW 20 MIN: CPT | Performed by: FAMILY MEDICINE

## 2023-05-03 PROCEDURE — G0439 PPPS, SUBSEQ VISIT: HCPCS | Performed by: FAMILY MEDICINE

## 2023-05-03 RX ORDER — IBUPROFEN 800 MG/1
TABLET ORAL
COMMUNITY
Start: 2023-04-20

## 2023-05-03 RX ORDER — MONTELUKAST SODIUM 10 MG/1
TABLET ORAL
COMMUNITY

## 2023-05-03 ASSESSMENT — PATIENT HEALTH QUESTIONNAIRE - PHQ9
SUM OF ALL RESPONSES TO PHQ QUESTIONS 1-9: 0
SUM OF ALL RESPONSES TO PHQ QUESTIONS 1-9: 0
2. FEELING DOWN, DEPRESSED OR HOPELESS: 0
SUM OF ALL RESPONSES TO PHQ9 QUESTIONS 1 & 2: 0
1. LITTLE INTEREST OR PLEASURE IN DOING THINGS: 0
SUM OF ALL RESPONSES TO PHQ QUESTIONS 1-9: 0
SUM OF ALL RESPONSES TO PHQ QUESTIONS 1-9: 0

## 2023-05-03 ASSESSMENT — LIFESTYLE VARIABLES
HOW MANY STANDARD DRINKS CONTAINING ALCOHOL DO YOU HAVE ON A TYPICAL DAY: PATIENT DOES NOT DRINK
HOW OFTEN DO YOU HAVE A DRINK CONTAINING ALCOHOL: NEVER

## 2023-05-03 NOTE — PATIENT INSTRUCTIONS
supplement is usually necessary to meet this goal.  When exposed to the sun, use a sunscreen that protects against both UVA and UVB radiation with an SPF of 30 or greater. Reapply every 2 to 3 hours or after sweating, drying off with a towel, or swimming. Always wear a seat belt when traveling in a car. Always wear a helmet when riding a bicycle or motorcycle.

## 2023-05-03 NOTE — PROGRESS NOTES
Medicare Annual Wellness Visit    Roland Manuel is here for Medicare AWV (awv)    Assessment & Plan   Medicare annual wellness visit, subsequent  Elevated fasting glucose  -     Comprehensive Metabolic Panel; Future  Elevated LDL cholesterol level  -     Lipid Panel; Future  -     Comprehensive Metabolic Panel; Future  Palpitations  -     TSH with Reflex; Future  -     CBC with Auto Differential; Future  Lupus (HCC)  Chronic right hip pain  -     External Referral To Orthopedic Surgery  Severe right groin pain  -     External Referral To Orthopedic Surgery  Encounter for screening mammogram for malignant neoplasm of breast  -     TREMAYNE DIGITAL SCREEN W OR WO CAD BILATERAL; Future      Recommendations for Preventive Services Due: see orders and patient instructions/AVS.  Recommended screening schedule for the next 5-10 years is provided to the patient in written form: see Patient Instructions/AVS.     No follow-ups on file. Subjective   The following acute and/or chronic problems were also addressed today:  Chronic Hip Pain    Patient's complete Health Risk Assessment and screening values have been reviewed and are found in Flowsheets. The following problems were reviewed today and where indicated follow up appointments were made and/or referrals ordered.     Positive Risk Factor Screenings with Interventions:          Drug Use:          Interpretation:  1-2: Low level - Monitor, re-assess at a later date  3-5: Moderate level - Further Investigation  6-8: Substantial level - Intensive Assessment  9-10: Severe level - Intensive Assessment       General HRA Questions:  Select all that apply: (!) New or Increased Pain, Stress (hip pain)    Pain Interventions:  Right hip, did PT and acupuncture, saw hip specialist and received injection , sees PM who gave hip injection which didn't help  Referral placed for Dr. Artemus Dakins Interventions:  Chronic hip pain  causing life stress       Weight and

## 2023-05-05 ENCOUNTER — TELEPHONE (OUTPATIENT)
Dept: FAMILY MEDICINE CLINIC | Age: 63
End: 2023-05-05

## 2023-05-05 DIAGNOSIS — G89.29 CHRONIC RIGHT HIP PAIN: Primary | ICD-10-CM

## 2023-05-05 DIAGNOSIS — M25.551 CHRONIC RIGHT HIP PAIN: Primary | ICD-10-CM

## 2023-05-05 RX ORDER — ROSUVASTATIN CALCIUM 20 MG/1
20 TABLET, COATED ORAL DAILY
Qty: 90 TABLET | Refills: 1 | Status: SHIPPED | OUTPATIENT
Start: 2023-05-05

## 2023-05-09 NOTE — TELEPHONE ENCOUNTER
Rafa Damon called back to see if there had been a response to this message for a new referral from last week. She reports she contacted her insurance and they do cover Dr. Gianna Burns. She is asking for a new referral to him as she was told by Dr. Rosalino Garcia office that they were unable to help her.

## 2023-05-15 ENCOUNTER — TELEPHONE (OUTPATIENT)
Dept: FAMILY MEDICINE CLINIC | Age: 63
End: 2023-05-15

## 2023-05-15 NOTE — TELEPHONE ENCOUNTER
----- Message from Brittney Pham sent at 5/12/2023  4:42 PM EDT -----  Subject: Message to Provider    QUESTIONS  Information for Provider? Pt has an appointment with Anjana Ashley off   [employer] on 05/18, and is needing all her records regarding her hip   sent over prior to the appointment. MRI place said they don't do disks   anymore and will need paper copies and imaging as well. Was told the   orthopedic doctor won't see her without all the information pertaining to   her hip. phone# [phone] Patient would like a call back regarding.   ---------------------------------------------------------------------------  --------------  Rosamaria FLETCHER  [phone]; OK to leave message on voicemail  ---------------------------------------------------------------------------  --------------  SCRIPT ANSWERS  Relationship to Patient?  Self

## 2023-05-15 NOTE — TELEPHONE ENCOUNTER
Faxed request to [facility]

## 2023-06-05 ENCOUNTER — TELEPHONE (OUTPATIENT)
Dept: CARDIOLOGY CLINIC | Age: 63
End: 2023-06-05

## 2023-06-05 NOTE — TELEPHONE ENCOUNTER
CARDIAC CLEARANCE REQUEST    What type of procedure are you having:  Right total hip replacement  Are you taking any blood thinners:  Asprin 81mg-hold 7 days prior  Type on anesthesia:  Spinal anesthesia w/mac sedation  When is your procedure scheduled for:  06/16/2023  What physician is performing your procedure:  Marvin Chen  Phone Number:  [phone] option 3  Fax number to send the letter:  [phone]

## 2023-06-06 NOTE — TELEPHONE ENCOUNTER
Ninoska, can we fit this patient in next week for preoperative visit with [facility], [address]. Was asked to follow-up 1 month following last visit, does not appear that took place. Last visit 8/2022.   Thank you

## 2023-06-08 ENCOUNTER — OFFICE VISIT (OUTPATIENT)
Dept: CARDIOLOGY CLINIC | Age: 63
End: 2023-06-08
Payer: MEDICARE

## 2023-06-08 VITALS
OXYGEN SATURATION: 96 % | DIASTOLIC BLOOD PRESSURE: 66 MMHG | SYSTOLIC BLOOD PRESSURE: 118 MMHG | HEIGHT: 61 IN | HEART RATE: 76 BPM | BODY MASS INDEX: 24.73 KG/M2 | WEIGHT: 131 LBS

## 2023-06-08 DIAGNOSIS — R00.2 PALPITATIONS: ICD-10-CM

## 2023-06-08 DIAGNOSIS — Z01.818 PRE-OP EXAM: Primary | ICD-10-CM

## 2023-06-08 PROCEDURE — 93000 ELECTROCARDIOGRAM COMPLETE: CPT | Performed by: NURSE PRACTITIONER

## 2023-06-08 PROCEDURE — 99213 OFFICE O/P EST LOW 20 MIN: CPT | Performed by: NURSE PRACTITIONER

## 2023-06-08 NOTE — PATIENT INSTRUCTIONS
Plan:   Continue aspirin and statin  Check magnesium level when you get labs on Monday   Okay to proceed with non-cardiac surgery at low cardiac risk  Follow up with Dr. Tricia Whitaker in 1 year or sooner if needed

## 2023-06-08 NOTE — PROGRESS NOTES
[address]  Cardiac Follow-up    Primary Care Doctor:  Darien Swanson DO    Chief Complaint   Patient presents with    New Patient    Cardiac Clearance        History of Present Illness:  I had the pleasure of seeing Erum Syed for cardiac clearance for upcoming total right hip replacement with spinal anesthesia w/mac sedation on 6/16/23   Hx of Lupus and TBI  Established care with DR. Whitlock 8/2022 for evaluation of palpitaions and dizziness. Work up included echocardiogram which was normal and cardiac event monitor which showed predominately NSR with intermittent SVT. She has some palpitations but seems to be improved from last visit. Palpitations Not as frequent through out the day. No chest pain. No shortness of breath. No edema. Erum Syed  denies chest pain, dyspnea, orthopnea, early saiety, edema, syncope. Past Medical History:   has a past medical history of COVID-19, Hyperlipidemia, and Lupus ([facility] [address] Surgical History:   has a past surgical history that includes Tonsillectomy; Appendectomy (1984); and Tubal ligation (1987). Social History:   reports that she has been smoking cigarettes. She has a 6.25 pack-year smoking history. She has never used smokeless tobacco. She reports that she does not currently use alcohol. She reports current drug use. Drug: Marijuana Jenn Dami). Family History:   Family History   Problem Relation Age of Onset    Heart Attack Mother     Alzheimer's Disease Father     Heart Disease Father     Atrial Fibrillation Father     Cancer Maternal Grandmother         stomach    Other Maternal Grandfather         tragic injury    Alzheimer's Disease Paternal Grandmother     Cancer Paternal Grandfather         prostate     Home Medications:  Prior to Admission medications    Medication Sig Start Date End Date Taking?  Authorizing Provider   rosuvastatin (CRESTOR) 20 MG tablet TAKE 1 TABLET BY MOUTH IN THE MORNING 5/5/23  Yes Timothy Marcial MD

## 2023-06-12 ENCOUNTER — TELEPHONE (OUTPATIENT)
Dept: CARDIOLOGY CLINIC | Age: 63
End: 2023-06-12

## 2023-06-12 DIAGNOSIS — Z01.818 PRE-OP EXAM: ICD-10-CM

## 2023-06-12 DIAGNOSIS — R00.2 PALPITATIONS: ICD-10-CM

## 2023-06-12 PROBLEM — F41.9 ANXIETY: Status: ACTIVE | Noted: 2023-06-12

## 2023-06-12 NOTE — TELEPHONE ENCOUNTER
Ninoska, you gave clearance in OV note on 6/8/23 but Rex needs pt to hold ASA.  Please advise, thank you

## 2023-06-12 NOTE — TELEPHONE ENCOUNTER
6/12 Mariel Woodard called and stated that pt is having a right hip replacement surgery on 6/19/23. Pt needs to hold Asprin 81mg for 5-7 days. Mariel Yanez also stated Yamilet needs a copy of last EKG performed and both can be sent at same time.      Jazlyn Yanez [phone] option #3  Fax number [phone]

## 2023-08-04 ENCOUNTER — HOSPITAL ENCOUNTER (OUTPATIENT)
Dept: MAMMOGRAPHY | Age: 63
Discharge: HOME | End: 2023-08-04
Payer: MEDICARE

## 2023-08-04 DIAGNOSIS — R73.01 ELEVATED FASTING GLUCOSE: ICD-10-CM

## 2023-08-04 DIAGNOSIS — E78.00 ELEVATED LDL CHOLESTEROL LEVEL: ICD-10-CM

## 2023-08-04 DIAGNOSIS — R00.2 PALPITATIONS: ICD-10-CM

## 2023-08-04 DIAGNOSIS — Z12.31 ENCOUNTER FOR SCREENING MAMMOGRAM FOR MALIGNANT NEOPLASM OF BREAST: ICD-10-CM

## 2023-08-04 LAB
BASOPHILS # BLD: 0.1 K/UL (ref 0–0.2)
BASOPHILS NFR BLD: 0.8 %
DEPRECATED RDW RBC AUTO: 13.3 % (ref 12.4–15.4)
EOSINOPHIL # BLD: 0.1 K/UL (ref 0–0.6)
EOSINOPHIL NFR BLD: 1.9 %
HCT VFR BLD AUTO: 39.8 % (ref 36–48)
HGB BLD-MCNC: 13.4 G/DL (ref 12–16)
LYMPHOCYTES # BLD: 2 K/UL (ref 1–5.1)
LYMPHOCYTES NFR BLD: 26.9 %
MCH RBC QN AUTO: 32.1 PG (ref 26–34)
MCHC RBC AUTO-ENTMCNC: 33.8 G/DL (ref 31–36)
MCV RBC AUTO: 95.2 FL (ref 80–100)
MONOCYTES # BLD: 0.5 K/UL (ref 0–1.3)
MONOCYTES NFR BLD: 6.5 %
NEUTROPHILS # BLD: 4.8 K/UL (ref 1.7–7.7)
NEUTROPHILS NFR BLD: 63.9 %
PLATELET # BLD AUTO: 193 K/UL (ref 135–450)
PMV BLD AUTO: 8.8 FL (ref 5–10.5)
RBC # BLD AUTO: 4.18 M/UL (ref 4–5.2)
WBC # BLD AUTO: 7.6 K/UL (ref 4–11)

## 2023-08-04 PROCEDURE — 77063 BREAST TOMOSYNTHESIS BI: CPT

## 2023-08-05 LAB
ALBUMIN SERPL-MCNC: 4.4 G/DL (ref 3.4–5)
ALBUMIN/GLOB SERPL: 2.2 {RATIO} (ref 1.1–2.2)
ALP SERPL-CCNC: 110 U/L (ref 40–129)
ALT SERPL-CCNC: 13 U/L (ref 10–40)
ANION GAP SERPL CALCULATED.3IONS-SCNC: 13 MMOL/L (ref 3–16)
AST SERPL-CCNC: 24 U/L (ref 15–37)
BILIRUB SERPL-MCNC: <0.2 MG/DL (ref 0–1)
BUN SERPL-MCNC: 21 MG/DL (ref 7–20)
CALCIUM SERPL-MCNC: 9.6 MG/DL (ref 8.3–10.6)
CHLORIDE SERPL-SCNC: 106 MMOL/L (ref 99–110)
CHOLEST SERPL-MCNC: 140 MG/DL (ref 0–199)
CO2 SERPL-SCNC: 26 MMOL/L (ref 21–32)
CREAT SERPL-MCNC: 1 MG/DL (ref 0.6–1.2)
GFR SERPLBLD CREATININE-BSD FMLA CKD-EPI: >60 ML/MIN/{1.73_M2}
GLUCOSE SERPL-MCNC: 84 MG/DL (ref 70–99)
HDLC SERPL-MCNC: 40 MG/DL (ref 40–60)
LDLC SERPL CALC-MCNC: 66 MG/DL
POTASSIUM SERPL-SCNC: 5 MMOL/L (ref 3.5–5.1)
PROT SERPL-MCNC: 6.4 G/DL (ref 6.4–8.2)
SODIUM SERPL-SCNC: 145 MMOL/L (ref 136–145)
TRIGL SERPL-MCNC: 168 MG/DL (ref 0–150)
TSH SERPL DL<=0.005 MIU/L-ACNC: 2.33 UIU/ML (ref 0.27–4.2)
VLDLC SERPL CALC-MCNC: 34 MG/DL

## 2023-11-13 ENCOUNTER — OFFICE VISIT (OUTPATIENT)
Dept: FAMILY MEDICINE CLINIC | Age: 63
End: 2023-11-13
Payer: MEDICARE

## 2023-11-13 VITALS
SYSTOLIC BLOOD PRESSURE: 109 MMHG | BODY MASS INDEX: 25.3 KG/M2 | HEART RATE: 86 BPM | WEIGHT: 134 LBS | DIASTOLIC BLOOD PRESSURE: 72 MMHG | TEMPERATURE: 97.1 F | RESPIRATION RATE: 16 BRPM | OXYGEN SATURATION: 96 % | HEIGHT: 61 IN

## 2023-11-13 DIAGNOSIS — F17.200 SMOKER: ICD-10-CM

## 2023-11-13 DIAGNOSIS — R05.2 SUBACUTE COUGH: Primary | ICD-10-CM

## 2023-11-13 PROCEDURE — 99214 OFFICE O/P EST MOD 30 MIN: CPT | Performed by: STUDENT IN AN ORGANIZED HEALTH CARE EDUCATION/TRAINING PROGRAM

## 2023-11-13 RX ORDER — BENZONATATE 100 MG/1
100 CAPSULE ORAL 3 TIMES DAILY PRN
Qty: 30 CAPSULE | Refills: 0 | Status: SHIPPED | OUTPATIENT
Start: 2023-11-13 | End: 2023-11-23

## 2023-11-13 RX ORDER — AZITHROMYCIN 250 MG/1
250 TABLET, FILM COATED ORAL SEE ADMIN INSTRUCTIONS
Qty: 6 TABLET | Refills: 0 | Status: SHIPPED | OUTPATIENT
Start: 2023-11-13 | End: 2023-11-18

## 2023-11-13 RX ORDER — AMOXICILLIN AND CLAVULANATE POTASSIUM 875; 125 MG/1; MG/1
1 TABLET, FILM COATED ORAL 2 TIMES DAILY
Qty: 14 TABLET | Refills: 0 | Status: CANCELLED | OUTPATIENT
Start: 2023-11-13 | End: 2023-11-20

## 2023-11-13 RX ORDER — VARENICLINE TARTRATE 0.5 (11)-1
1 KIT ORAL SEE ADMIN INSTRUCTIONS
Qty: 53 EACH | Refills: 0 | Status: SHIPPED | OUTPATIENT
Start: 2023-11-13

## 2023-11-13 RX ORDER — VARENICLINE TARTRATE 0.5 MG/1
.5-1 TABLET, FILM COATED ORAL SEE ADMIN INSTRUCTIONS
Qty: 57 TABLET | Refills: 0 | Status: SHIPPED | OUTPATIENT
Start: 2023-11-13 | End: 2023-11-13

## 2023-11-13 SDOH — ECONOMIC STABILITY: FOOD INSECURITY: WITHIN THE PAST 12 MONTHS, YOU WORRIED THAT YOUR FOOD WOULD RUN OUT BEFORE YOU GOT MONEY TO BUY MORE.: NEVER TRUE

## 2023-11-13 SDOH — ECONOMIC STABILITY: FOOD INSECURITY: WITHIN THE PAST 12 MONTHS, THE FOOD YOU BOUGHT JUST DIDN'T LAST AND YOU DIDN'T HAVE MONEY TO GET MORE.: NEVER TRUE

## 2023-11-13 SDOH — ECONOMIC STABILITY: INCOME INSECURITY: HOW HARD IS IT FOR YOU TO PAY FOR THE VERY BASICS LIKE FOOD, HOUSING, MEDICAL CARE, AND HEATING?: NOT HARD AT ALL

## 2023-11-13 ASSESSMENT — ENCOUNTER SYMPTOMS
SHORTNESS OF BREATH: 0
COUGH: 1
RHINORRHEA: 0
WHEEZING: 0
SORE THROAT: 0

## 2023-11-13 ASSESSMENT — PATIENT HEALTH QUESTIONNAIRE - PHQ9
SUM OF ALL RESPONSES TO PHQ QUESTIONS 1-9: 0
2. FEELING DOWN, DEPRESSED OR HOPELESS: 0
SUM OF ALL RESPONSES TO PHQ QUESTIONS 1-9: 0
SUM OF ALL RESPONSES TO PHQ9 QUESTIONS 1 & 2: 0
SUM OF ALL RESPONSES TO PHQ QUESTIONS 1-9: 0
SUM OF ALL RESPONSES TO PHQ QUESTIONS 1-9: 0
1. LITTLE INTEREST OR PLEASURE IN DOING THINGS: 0

## 2023-11-13 NOTE — PATIENT INSTRUCTIONS
Antibiotic   Azithromycin - day 1: 2 pills,  DAYS 2-5: 1 pill daily for 4 days   - Benzonatate pearls for cough control during the day. Encouraged plenty of rest and adequate fluid intake. May use warm beverages with honey and lemon. Steamy showers may aid in clearing congestion. Over the counter Mucinex (guaifenesin) with water can also help loosen phlegm. May use dextromethorphan (such as Delsym or Robitusinn) as additional cough suppressant. Discussed warnings signs of pneumonia including increasing fatigue, malaise, development of fever, chest tightness, worsening of cough.        Chantiix - Take as directed   Cut down on smokinh

## 2023-11-15 ENCOUNTER — TELEPHONE (OUTPATIENT)
Dept: ADMINISTRATIVE | Age: 63
End: 2023-11-15

## 2023-11-15 NOTE — TELEPHONE ENCOUNTER
Submitted PA for Varenicline Tartrate (Starter) 0.5 MG X 11 &1 MG X 42 tablets  Via CMM Key: [id number] STATUS: PA Case: [id number], Status: Approved, Coverage Starts on: 8/16/2023 12:00:00 AM, Coverage Ends on: 2/13/2024 12:00:00 AM.    Please notify patient. Thank you.

## 2023-11-17 ENCOUNTER — TELEPHONE (OUTPATIENT)
Dept: FAMILY MEDICINE CLINIC | Age: 63
End: 2023-11-17

## 2023-11-17 DIAGNOSIS — R05.1 ACUTE COUGH: Primary | ICD-10-CM

## 2023-11-17 RX ORDER — METHYLPREDNISOLONE 4 MG/1
TABLET ORAL
Qty: 1 KIT | Refills: 0 | Status: SHIPPED | OUTPATIENT
Start: 2023-11-17 | End: 2023-11-23

## 2023-11-17 RX ORDER — DEXTROMETHORPHAN HYDROBROMIDE AND PROMETHAZINE HYDROCHLORIDE 15; 6.25 MG/5ML; MG/5ML
5 SYRUP ORAL 4 TIMES DAILY PRN
Qty: 118 ML | Refills: 0 | Status: SHIPPED | OUTPATIENT
Start: 2023-11-17

## 2023-11-17 NOTE — TELEPHONE ENCOUNTER
She has been on 2 antibiotics in the last 2 months, try steroid taper prior to starting another antibiotic.   Medrol Dosepak was sent to her pharmacy

## 2023-11-17 NOTE — TELEPHONE ENCOUNTER
Pt was seen on 11/13 by Dr Jasvir Thomason and prescribed Azithromycin. She has finished it and believes she needs another antibiotic. Says the Celia Clore is not helping her cough either. Please advise.

## 2023-11-17 NOTE — TELEPHONE ENCOUNTER
Pt was given the message regarding the steroid. She is asking if a cough syrup could be called in because the Tessalon is not helping.

## 2024-01-23 ENCOUNTER — OFFICE VISIT (OUTPATIENT)
Dept: FAMILY MEDICINE CLINIC | Age: 64
End: 2024-01-23
Payer: MEDICARE

## 2024-01-23 VITALS
RESPIRATION RATE: 16 BRPM | SYSTOLIC BLOOD PRESSURE: 130 MMHG | TEMPERATURE: 97.2 F | WEIGHT: 134 LBS | BODY MASS INDEX: 25.32 KG/M2 | OXYGEN SATURATION: 97 % | HEART RATE: 68 BPM | DIASTOLIC BLOOD PRESSURE: 68 MMHG

## 2024-01-23 DIAGNOSIS — M32.9 LUPUS (HCC): ICD-10-CM

## 2024-01-23 DIAGNOSIS — K21.9 GASTROESOPHAGEAL REFLUX DISEASE, UNSPECIFIED WHETHER ESOPHAGITIS PRESENT: ICD-10-CM

## 2024-01-23 DIAGNOSIS — F41.9 ANXIETY: ICD-10-CM

## 2024-01-23 DIAGNOSIS — E78.00 ELEVATED LDL CHOLESTEROL LEVEL: ICD-10-CM

## 2024-01-23 DIAGNOSIS — G89.29 CHRONIC RIGHT HIP PAIN: Primary | ICD-10-CM

## 2024-01-23 DIAGNOSIS — R73.01 ELEVATED FASTING GLUCOSE: ICD-10-CM

## 2024-01-23 DIAGNOSIS — M25.551 CHRONIC RIGHT HIP PAIN: Primary | ICD-10-CM

## 2024-01-23 PROCEDURE — 99214 OFFICE O/P EST MOD 30 MIN: CPT | Performed by: FAMILY MEDICINE

## 2024-01-23 RX ORDER — HYDROXYZINE HYDROCHLORIDE 25 MG/1
25 TABLET, FILM COATED ORAL EVERY 8 HOURS PRN
Qty: 60 TABLET | Refills: 1 | Status: SHIPPED | OUTPATIENT
Start: 2024-01-23

## 2024-01-23 SDOH — ECONOMIC STABILITY: FOOD INSECURITY: WITHIN THE PAST 12 MONTHS, THE FOOD YOU BOUGHT JUST DIDN'T LAST AND YOU DIDN'T HAVE MONEY TO GET MORE.: NEVER TRUE

## 2024-01-23 SDOH — ECONOMIC STABILITY: FOOD INSECURITY: WITHIN THE PAST 12 MONTHS, YOU WORRIED THAT YOUR FOOD WOULD RUN OUT BEFORE YOU GOT MONEY TO BUY MORE.: NEVER TRUE

## 2024-01-23 SDOH — ECONOMIC STABILITY: INCOME INSECURITY: HOW HARD IS IT FOR YOU TO PAY FOR THE VERY BASICS LIKE FOOD, HOUSING, MEDICAL CARE, AND HEATING?: NOT HARD AT ALL

## 2024-01-23 ASSESSMENT — PATIENT HEALTH QUESTIONNAIRE - PHQ9
SUM OF ALL RESPONSES TO PHQ QUESTIONS 1-9: 2
2. FEELING DOWN, DEPRESSED OR HOPELESS: SEVERAL DAYS
1. LITTLE INTEREST OR PLEASURE IN DOING THINGS: SEVERAL DAYS
SUM OF ALL RESPONSES TO PHQ9 QUESTIONS 1 & 2: 2
2. FEELING DOWN, DEPRESSED OR HOPELESS: 1
1. LITTLE INTEREST OR PLEASURE IN DOING THINGS: 1
SUM OF ALL RESPONSES TO PHQ9 QUESTIONS 1 & 2: 2

## 2024-01-23 NOTE — PROGRESS NOTES
1/23/2024    This is a 63 y.o. female   Chief Complaint   Patient presents with    Medication Check   .    HPI  Pt presents today for the following:    Right Hip Pain: States that the hip is doing great, had total hip replacement at [facility] on 06/19/23, still has some upper anterior right LE numbness     GERD: Taking Pantoprazole 40 mg daily and Famotidine 40 mg BID, no issues    Admits to anxiety and takes Xanax 0.5 mg, has had 2 referral s for psych and states she was told they don't prescribe it. Has been without it since December. Was told by her PM doc that Dr. Shelby may be a source of prescribing the Xanax. States that Psych tried her on a dug that was close to Xanax. Broke out in hives.   Past Medical History:   Diagnosis Date    COVID-19     Hyperlipidemia     Lupus (HCC) 1960       Orders Only on 08/04/2023   Component Date Value Ref Range Status    Sodium 08/04/2023 145  136 - 145 mmol/L Final    Potassium 08/04/2023 5.0  3.5 - 5.1 mmol/L Final    Chloride 08/04/2023 106  99 - 110 mmol/L Final    CO2 08/04/2023 26  21 - 32 mmol/L Final    Anion Gap 08/04/2023 13  3 - 16 Final    Glucose 08/04/2023 84  70 - 99 mg/dL Final    BUN 08/04/2023 21 (H)  7 - 20 mg/dL Final    Creatinine 08/04/2023 1.0  0.6 - 1.2 mg/dL Final    Est, Glom Filt Rate 08/04/2023 >60  >60 Final    Comment: Pediatric calculator link  https://www.kidney.org/professionals/kdoqi/gfr_calculatorped  Effective Oct 3, 2022  These results are not intended for use in patients  <18 years of age.  eGFR results are calculated without  a race factor using the 2021 CKD-EPI equation.  Careful  clinical correlation is recommended, particularly when  comparing to results calculated using previous equations.  The CKD-EPI equation is less accurate in patients with  extremes of muscle mass, extra-renal metabolism of  creatinine, excessive creatinine ingestion, or following  therapy that affects renal tubular secretion.      Calcium 08/04/2023 9.6  8.3 - 10.6

## 2024-01-25 RX ORDER — ROSUVASTATIN CALCIUM 20 MG/1
20 TABLET, COATED ORAL DAILY
Qty: 90 TABLET | Refills: 3 | Status: SHIPPED | OUTPATIENT
Start: 2024-01-25

## 2024-01-25 RX ORDER — SUMATRIPTAN 100 MG/1
100 TABLET, FILM COATED ORAL PRN
Qty: 12 TABLET | Refills: 2 | Status: SHIPPED | OUTPATIENT
Start: 2024-01-25

## 2024-01-25 RX ORDER — PANTOPRAZOLE SODIUM 40 MG/1
40 TABLET, DELAYED RELEASE ORAL DAILY
Qty: 90 TABLET | Refills: 1 | Status: SHIPPED | OUTPATIENT
Start: 2024-01-25

## 2024-01-25 NOTE — TELEPHONE ENCOUNTER
Future Appointments   Date Time Provider Department Center   5/7/2024  9:00 AM Ahmet Regan, DO [facility]     LOV 1/23/2024

## 2024-02-21 ENCOUNTER — TELEPHONE (OUTPATIENT)
Dept: FAMILY MEDICINE CLINIC | Age: 64
End: 2024-02-21

## 2024-02-21 NOTE — TELEPHONE ENCOUNTER
Symptoms:stomach sounds, loose stools    How long have you had the symptoms:since saturday    What medications have you tried:gas X, Malkari have not worked    Pharmacy[facility]    Appointment offered:pt advise she may need to schedule an appt.    Pt is wanting to know if some sort of stomach bug is going around and what she can do to get thru it.     Please advise.

## 2024-02-21 NOTE — TELEPHONE ENCOUNTER
Spoke with patient, Scheduled. Advised to be sooner in urgent care if worsening   Future Appointments   Date Time Provider Department Center   2/23/2024  8:20 AM Ahmet Regan DO [facility] [place] [facility]   5/7/2024  9:00 AM Ahmet Regan DO [facility] [place] [facility]   6/3/2024  9:45 AM Ag Whitlock MD [facility]

## 2024-02-22 ENCOUNTER — HOSPITAL ENCOUNTER (EMERGENCY)
Age: 64
Discharge: HOME | End: 2024-02-22
Attending: EMERGENCY MEDICINE
Payer: MEDICARE

## 2024-02-22 ENCOUNTER — OFFICE VISIT (OUTPATIENT)
Dept: URGENT CARE | Age: 64
End: 2024-02-22

## 2024-02-22 ENCOUNTER — APPOINTMENT (OUTPATIENT)
Dept: CT IMAGING | Age: 64
End: 2024-02-22
Payer: MEDICARE

## 2024-02-22 VITALS
DIASTOLIC BLOOD PRESSURE: 58 MMHG | HEIGHT: 61 IN | TEMPERATURE: 98.8 F | WEIGHT: 135.1 LBS | SYSTOLIC BLOOD PRESSURE: 109 MMHG | HEART RATE: 73 BPM | RESPIRATION RATE: 16 BRPM | OXYGEN SATURATION: 97 % | BODY MASS INDEX: 25.51 KG/M2

## 2024-02-22 VITALS
OXYGEN SATURATION: 94 % | RESPIRATION RATE: 14 BRPM | BODY MASS INDEX: 25.7 KG/M2 | TEMPERATURE: 98.2 F | DIASTOLIC BLOOD PRESSURE: 75 MMHG | WEIGHT: 136 LBS | SYSTOLIC BLOOD PRESSURE: 120 MMHG | HEART RATE: 82 BPM

## 2024-02-22 DIAGNOSIS — R10.84 GENERALIZED ABDOMINAL PAIN: Primary | ICD-10-CM

## 2024-02-22 DIAGNOSIS — R31.29 MICROSCOPIC HEMATURIA: ICD-10-CM

## 2024-02-22 DIAGNOSIS — R10.30 LOWER ABDOMINAL PAIN: Primary | ICD-10-CM

## 2024-02-22 LAB
ALBUMIN SERPL-MCNC: 4.5 G/DL (ref 3.4–5)
ALBUMIN/GLOB SERPL: 1.6 {RATIO} (ref 1.1–2.2)
ALP SERPL-CCNC: 112 U/L (ref 40–129)
ALT SERPL-CCNC: 14 U/L (ref 10–40)
AMORPH SED URNS QL MICRO: ABNORMAL /HPF
ANION GAP SERPL CALCULATED.3IONS-SCNC: 11 MMOL/L (ref 3–16)
AST SERPL-CCNC: 22 U/L (ref 15–37)
BACTERIA URNS QL MICRO: ABNORMAL /HPF
BASOPHILS # BLD: 0 K/UL (ref 0–0.2)
BASOPHILS NFR BLD: 0.4 %
BILIRUB SERPL-MCNC: <0.2 MG/DL (ref 0–1)
BILIRUB UR QL STRIP.AUTO: NEGATIVE
BUN SERPL-MCNC: 12 MG/DL (ref 7–20)
CALCIUM SERPL-MCNC: 9.9 MG/DL (ref 8.3–10.6)
CHLORIDE SERPL-SCNC: 103 MMOL/L (ref 99–110)
CLARITY UR: CLEAR
CO2 SERPL-SCNC: 28 MMOL/L (ref 21–32)
COLOR UR: YELLOW
CREAT SERPL-MCNC: 0.8 MG/DL (ref 0.6–1.2)
DEPRECATED RDW RBC AUTO: 12.8 % (ref 12.4–15.4)
EOSINOPHIL # BLD: 0 K/UL (ref 0–0.6)
EOSINOPHIL NFR BLD: 0.4 %
EPI CELLS #/AREA URNS HPF: ABNORMAL /HPF (ref 0–5)
FLUAV RNA UPPER RESP QL NAA+PROBE: NEGATIVE
FLUBV AG NPH QL: NEGATIVE
GFR SERPLBLD CREATININE-BSD FMLA CKD-EPI: >60 ML/MIN/{1.73_M2}
GLUCOSE SERPL-MCNC: 94 MG/DL (ref 70–99)
GLUCOSE UR STRIP.AUTO-MCNC: NEGATIVE MG/DL
HCT VFR BLD AUTO: 41.4 % (ref 36–48)
HGB BLD-MCNC: 14.2 G/DL (ref 12–16)
HGB UR QL STRIP.AUTO: ABNORMAL
KETONES UR STRIP.AUTO-MCNC: NEGATIVE MG/DL
LEUKOCYTE ESTERASE UR QL STRIP.AUTO: NEGATIVE
LYMPHOCYTES # BLD: 1.9 K/UL (ref 1–5.1)
LYMPHOCYTES NFR BLD: 17.6 %
MCH RBC QN AUTO: 31.6 PG (ref 26–34)
MCHC RBC AUTO-ENTMCNC: 34.2 G/DL (ref 31–36)
MCV RBC AUTO: 92.4 FL (ref 80–100)
MONOCYTES # BLD: 0.6 K/UL (ref 0–1.3)
MONOCYTES NFR BLD: 5.7 %
MUCOUS THREADS #/AREA URNS LPF: ABNORMAL /LPF
NEUTROPHILS # BLD: 8.3 K/UL (ref 1.7–7.7)
NEUTROPHILS NFR BLD: 75.9 %
NITRITE UR QL STRIP.AUTO: NEGATIVE
PH UR STRIP.AUTO: 6 [PH] (ref 5–8)
PLATELET # BLD AUTO: 177 K/UL (ref 135–450)
PMV BLD AUTO: 7.8 FL (ref 5–10.5)
POTASSIUM SERPL-SCNC: 4.1 MMOL/L (ref 3.5–5.1)
PROT SERPL-MCNC: 7.4 G/DL (ref 6.4–8.2)
PROT UR STRIP.AUTO-MCNC: NEGATIVE MG/DL
RBC # BLD AUTO: 4.48 M/UL (ref 4–5.2)
RBC #/AREA URNS HPF: ABNORMAL /HPF (ref 0–4)
SARS-COV-2 RDRP RESP QL NAA+PROBE: NOT DETECTED
SODIUM SERPL-SCNC: 142 MMOL/L (ref 136–145)
SP GR UR STRIP.AUTO: 1.02 (ref 1–1.03)
UA DIPSTICK W REFLEX MICRO PNL UR: YES
URN SPEC COLLECT METH UR: ABNORMAL
UROBILINOGEN UR STRIP-ACNC: 0.2 E.U./DL
WBC # BLD AUTO: 11 K/UL (ref 4–11)
WBC #/AREA URNS HPF: ABNORMAL /HPF (ref 0–5)

## 2024-02-22 PROCEDURE — 85025 COMPLETE CBC W/AUTO DIFF WBC: CPT

## 2024-02-22 PROCEDURE — 99285 EMERGENCY DEPT VISIT HI MDM: CPT

## 2024-02-22 PROCEDURE — 74177 CT ABD & PELVIS W/CONTRAST: CPT

## 2024-02-22 PROCEDURE — 87635 SARS-COV-2 COVID-19 AMP PRB: CPT

## 2024-02-22 PROCEDURE — 6360000004 HC RX CONTRAST MEDICATION: Performed by: EMERGENCY MEDICINE

## 2024-02-22 PROCEDURE — 6370000000 HC RX 637 (ALT 250 FOR IP): Performed by: EMERGENCY MEDICINE

## 2024-02-22 PROCEDURE — 80053 COMPREHEN METABOLIC PANEL: CPT

## 2024-02-22 PROCEDURE — 87804 INFLUENZA ASSAY W/OPTIC: CPT

## 2024-02-22 PROCEDURE — 81001 URINALYSIS AUTO W/SCOPE: CPT

## 2024-02-22 RX ORDER — ACETAMINOPHEN 325 MG/1
650 TABLET ORAL ONCE
Status: COMPLETED | OUTPATIENT
Start: 2024-02-22 | End: 2024-02-22

## 2024-02-22 RX ADMIN — ACETAMINOPHEN 650 MG: 325 TABLET ORAL at 14:18

## 2024-02-22 RX ADMIN — IOPAMIDOL 75 ML: 755 INJECTION, SOLUTION INTRAVENOUS at 15:15

## 2024-02-22 ASSESSMENT — PAIN SCALES - GENERAL: PAINLEVEL_OUTOF10: 7

## 2024-02-22 ASSESSMENT — PAIN DESCRIPTION - DESCRIPTORS: DESCRIPTORS: PRESSURE

## 2024-02-22 ASSESSMENT — PAIN - FUNCTIONAL ASSESSMENT: PAIN_FUNCTIONAL_ASSESSMENT: 0-10

## 2024-02-22 ASSESSMENT — PAIN DESCRIPTION - ORIENTATION: ORIENTATION: MID;LOWER

## 2024-02-22 ASSESSMENT — PAIN DESCRIPTION - LOCATION: LOCATION: ABDOMEN

## 2024-02-22 NOTE — PROGRESS NOTES
Patient arrives to urgent care with abdominal pain worsening since Saturday.  Unable to obtain labs here.  Also has sore throat but would prefer evaluation in ED.  We discussed risks and benefits.  Patient to ED by car for evaluation of abdominal pain-no immediate labs or imaging available in UC setting.

## 2024-02-22 NOTE — ED PROVIDER NOTES
Male   Other Topics Concern    Not on file   Social History Narrative    Not on file     Social Determinants of Health     Financial Resource Strain: Low Risk  (1/23/2024)    Overall Financial Resource Strain (CARDIA)     Difficulty of Paying Living Expenses: Not hard at all   Food Insecurity: No Food Insecurity (1/23/2024)    Hunger Vital Sign     Worried About Running Out of Food in the Last Year: Never true     Ran Out of Food in the Last Year: Never true   Transportation Needs: Unknown (1/23/2024)    PRAPARE - Transportation     Lack of Transportation (Medical): Not on file     Lack of Transportation (Non-Medical): No   Physical Activity: Inactive (5/3/2023)    Exercise Vital Sign     Days of Exercise per Week: 0 days     Minutes of Exercise per Session: 0 min   Stress: Not on file   Social Connections: Not on file   Intimate Partner Violence: Not on file   Housing Stability: Unknown (1/23/2024)    Housing Stability Vital Sign     Unable to Pay for Housing in the Last Year: Not on file     Number of Places Lived in the Last Year: Not on file     Unstable Housing in the Last Year: No     No current facility-administered medications for this encounter.     Current Outpatient Medications   Medication Sig Dispense Refill    rosuvastatin (CRESTOR) 20 MG tablet TAKE 1 TABLET BY MOUTH IN THE MORNING 90 tablet 3    pantoprazole (PROTONIX) 40 MG tablet TAKE 1 TABLET BY MOUTH EVERY DAY 90 tablet 1    SUMAtriptan (IMITREX) 100 MG tablet TAKE 1 TABLET BY MOUTH AS NEEDED 12 tablet 2    Aspirin-Acetaminophen-Caffeine (EXCEDRIN PO) Take by mouth      hydrOXYzine HCl (ATARAX) 25 MG tablet Take 1 tablet by mouth every 8 hours as needed for Anxiety 60 tablet 1    famotidine (PEPCID) 40 MG tablet TAKE 1 TABLET BY MOUTH TWICE DAILY      meclizine (ANTIVERT) 25 MG tablet TAKE 1 TABLET BY MOUTH THREE TIMES DAILY AS NEEDED      aspirin EC 81 MG EC tablet Take 1 tablet by mouth in the morning. 90 tablet 1    HYDROcodone-acetaminophen

## 2024-02-23 ENCOUNTER — NURSE ONLY (OUTPATIENT)
Dept: FAMILY MEDICINE CLINIC | Age: 64
End: 2024-02-23

## 2024-02-23 ENCOUNTER — TELEPHONE (OUTPATIENT)
Dept: FAMILY MEDICINE CLINIC | Age: 64
End: 2024-02-23

## 2024-02-23 DIAGNOSIS — R39.9 UTI SYMPTOMS: Primary | ICD-10-CM

## 2024-02-23 PROCEDURE — 81002 URINALYSIS NONAUTO W/O SCOPE: CPT | Performed by: FAMILY MEDICINE

## 2024-02-23 RX ORDER — NITROFURANTOIN 25; 75 MG/1; MG/1
100 CAPSULE ORAL 2 TIMES DAILY
Qty: 14 CAPSULE | Refills: 0 | Status: SHIPPED | OUTPATIENT
Start: 2024-02-23 | End: 2024-03-01

## 2024-02-23 NOTE — TELEPHONE ENCOUNTER
Please call patient and let her know that I have prescribed Macrobid  (antibiotic) for her symptoms.  I would like for her to stop by the office to give a urine that we can send for culture.  Thank you.

## 2024-02-23 NOTE — TELEPHONE ENCOUNTER
Patient went to [facility] yesterday for lower abdominal pain and loose stool that started Saturday, they advised that she go to the ER. Was seen at [facility]. Had blood work, CT scan, and urinalysis. Patient was not given a clear diagnosis. Please review urinalysis. Patient not sure what to do about the abdominal pain that she is experiencing.

## 2024-02-25 LAB — BACTERIA UR CULT: NORMAL

## 2024-03-28 RX ORDER — MECLIZINE HYDROCHLORIDE 25 MG/1
TABLET ORAL
Qty: 90 TABLET | Refills: 0 | Status: SHIPPED | OUTPATIENT
Start: 2024-03-28

## 2024-03-28 RX ORDER — ONDANSETRON HYDROCHLORIDE 8 MG/1
8 TABLET, FILM COATED ORAL EVERY 6 HOURS PRN
Qty: 90 TABLET | Refills: 0 | Status: SHIPPED | OUTPATIENT
Start: 2024-03-28

## 2024-03-28 NOTE — TELEPHONE ENCOUNTER
Patient called.  She was only given a 10 day supply of Meclizine and Zofran.  They are each prescribed as tid.  She was only give # 30 and it should be # 90    She does not have enough medication for the weekend    [facility] on file in chart

## 2024-04-01 RX ORDER — ONDANSETRON HYDROCHLORIDE 8 MG/1
8 TABLET, FILM COATED ORAL EVERY 6 HOURS PRN
Qty: 90 TABLET | Refills: 0 | OUTPATIENT
Start: 2024-04-01

## 2024-04-01 NOTE — TELEPHONE ENCOUNTER
LOV 1/23/2024  Future Appointments   Date Time Provider Department Center   5/7/2024  9:00 AM Ahmet Regan DO [facility] [place] - [facility]   6/3/2024  9:45 AM Ag Whitlock MD [facility]

## 2024-05-01 RX ORDER — MECLIZINE HYDROCHLORIDE 25 MG/1
TABLET ORAL
Qty: 90 TABLET | Refills: 0 | Status: SHIPPED | OUTPATIENT
Start: 2024-05-01

## 2024-06-03 ENCOUNTER — TELEPHONE (OUTPATIENT)
Dept: CARDIOLOGY CLINIC | Age: 64
End: 2024-06-03

## 2024-06-03 ENCOUNTER — OFFICE VISIT (OUTPATIENT)
Dept: CARDIOLOGY CLINIC | Age: 64
End: 2024-06-03
Payer: MEDICARE

## 2024-06-03 VITALS
HEIGHT: 61 IN | BODY MASS INDEX: 26.53 KG/M2 | OXYGEN SATURATION: 96 % | DIASTOLIC BLOOD PRESSURE: 76 MMHG | HEART RATE: 86 BPM | SYSTOLIC BLOOD PRESSURE: 122 MMHG | WEIGHT: 140.5 LBS

## 2024-06-03 DIAGNOSIS — R94.31 ABNORMAL EKG: ICD-10-CM

## 2024-06-03 DIAGNOSIS — R00.2 PALPITATIONS: Primary | ICD-10-CM

## 2024-06-03 DIAGNOSIS — R07.2 PRECORDIAL PAIN: ICD-10-CM

## 2024-06-03 PROCEDURE — 99214 OFFICE O/P EST MOD 30 MIN: CPT | Performed by: INTERNAL MEDICINE

## 2024-06-03 SDOH — HEALTH STABILITY: PHYSICAL HEALTH: ON AVERAGE, HOW MANY DAYS PER WEEK DO YOU ENGAGE IN MODERATE TO STRENUOUS EXERCISE (LIKE A BRISK WALK)?: 7 DAYS

## 2024-06-03 SDOH — HEALTH STABILITY: PHYSICAL HEALTH: ON AVERAGE, HOW MANY MINUTES DO YOU ENGAGE IN EXERCISE AT THIS LEVEL?: 120 MIN

## 2024-06-03 ASSESSMENT — LIFESTYLE VARIABLES
HOW MANY STANDARD DRINKS CONTAINING ALCOHOL DO YOU HAVE ON A TYPICAL DAY: 0
HOW OFTEN DO YOU HAVE A DRINK CONTAINING ALCOHOL: 1
HOW OFTEN DO YOU HAVE SIX OR MORE DRINKS ON ONE OCCASION: 1

## 2024-06-03 NOTE — PATIENT INSTRUCTIONS
Plan:  Smoking cessation encouraged.   ~We discussed starting Metoprolol for palpitations- she would like to hold off for now   ~Plain exercise stress test   ~Discussed vascular screening in [place]   ~CT calcium score    ~Proscan [id number] ([id number])    Cardiac medications reviewed including indications and pertinent side effects. Medication list updated at this visit.   Patient verbalizes understanding of the need for treatment and education has been provided at today's visit. Additional education material will be provided in after visit summary.    Check blood pressure and heart rate at home a few times per week- keep a log with dates and times and bring to office visit   Regular exercise and following a healthy diet encouraged   Follow up with NP in 6 months

## 2024-06-03 NOTE — TELEPHONE ENCOUNTER
SCREENING QUESTIONS:       Do you have a history of cardiovascular disease, this includes any of the following- heart stent and/or open-heart surgery, stroke or abdominal aortic aneurysm? No (If the answer is yes please do not schedule)     Are you currently following up with a cardiologist? Yes     If no, would you like our office to contact you? Yes        Do you have a family history of abdominal aortic aneurysm, heart attack or stroke? Yes    Do you have high cholesterol? Yes    Do you have high blood pressure? No    Do you have diabetes? No    Do you currently smoke or are you a former smoker? Yes      WRAP UP:     PLEASE CHECK EACH ONE     [place] by: [facility]    [x] Scheduled on 08/07/24 at 10:00 at the [place] office    [x] Instructions given to patient- Nothing to eat or drink 8 hrs prior to appointment and wear loose, comfortable clothing    [x] Not scheduled due to previous history themselves    [x] Sent to  pool, has a cardiac history and is not following a cardiologist, would like a follow call
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
## 2024-06-04 ENCOUNTER — OFFICE VISIT (OUTPATIENT)
Dept: FAMILY MEDICINE CLINIC | Age: 64
End: 2024-06-04
Payer: MEDICARE

## 2024-06-04 ENCOUNTER — TELEPHONE (OUTPATIENT)
Dept: FAMILY MEDICINE CLINIC | Age: 64
End: 2024-06-04

## 2024-06-04 VITALS
HEIGHT: 62 IN | WEIGHT: 138.2 LBS | TEMPERATURE: 97.3 F | RESPIRATION RATE: 16 BRPM | HEART RATE: 69 BPM | OXYGEN SATURATION: 98 % | BODY MASS INDEX: 25.43 KG/M2 | SYSTOLIC BLOOD PRESSURE: 125 MMHG | DIASTOLIC BLOOD PRESSURE: 77 MMHG

## 2024-06-04 DIAGNOSIS — Z00.00 MEDICARE ANNUAL WELLNESS VISIT, SUBSEQUENT: Primary | ICD-10-CM

## 2024-06-04 DIAGNOSIS — F41.9 ANXIETY: ICD-10-CM

## 2024-06-04 DIAGNOSIS — K21.9 GASTROESOPHAGEAL REFLUX DISEASE, UNSPECIFIED WHETHER ESOPHAGITIS PRESENT: ICD-10-CM

## 2024-06-04 DIAGNOSIS — R31.9 HEMATURIA, UNSPECIFIED TYPE: Primary | ICD-10-CM

## 2024-06-04 DIAGNOSIS — M79.18 MUSCLE ACHE OF EXTREMITY: ICD-10-CM

## 2024-06-04 DIAGNOSIS — R42 DIZZINESS: ICD-10-CM

## 2024-06-04 DIAGNOSIS — R11.0 NAUSEA: ICD-10-CM

## 2024-06-04 DIAGNOSIS — R31.9 HEMATURIA, UNSPECIFIED TYPE: ICD-10-CM

## 2024-06-04 LAB
BILIRUBIN, POC: NEGATIVE
BLOOD URINE, POC: NORMAL
CLARITY, POC: NORMAL
COLOR, POC: NORMAL
GLUCOSE URINE, POC: NEGATIVE
KETONES, POC: NEGATIVE
LEUKOCYTE EST, POC: NEGATIVE
NITRITE, POC: NEGATIVE
PH, POC: 7
PROTEIN, POC: NEGATIVE
SPECIFIC GRAVITY, POC: 1.01
UROBILINOGEN, POC: NORMAL

## 2024-06-04 PROCEDURE — G0439 PPPS, SUBSEQ VISIT: HCPCS | Performed by: FAMILY MEDICINE

## 2024-06-04 PROCEDURE — 81003 URINALYSIS AUTO W/O SCOPE: CPT | Performed by: FAMILY MEDICINE

## 2024-06-04 PROCEDURE — 99214 OFFICE O/P EST MOD 30 MIN: CPT | Performed by: FAMILY MEDICINE

## 2024-06-04 NOTE — PATIENT INSTRUCTIONS
safety. Be sure to make and go to all appointments, and call your doctor if you are having problems. It's also a good idea to know your test results and keep a list of the medicines you take.  How can you care for yourself at home?  Diet    Use less salt when you cook and eat. This helps lower your blood pressure. Taste food before salting. Add only a little salt when you think you need it. With time, your taste buds will adjust to less salt.     Eat fewer snack items, fast foods, canned soups, and other high-salt, high-fat, processed foods.     Read food labels and try to avoid saturated and trans fats. They increase your risk of heart disease by raising cholesterol levels.     Limit the amount of solid fat--butter, margarine, and shortening--you eat. Use olive, peanut, or canola oil when you cook. Bake, broil, and steam foods instead of frying them.     Eat a variety of fruit and vegetables every day. Dark green, deep orange, red, or yellow fruits and vegetables are especially good for you. Examples include spinach, carrots, peaches, and berries.     Foods high in fiber can reduce your cholesterol and provide important vitamins and minerals. High-fiber foods include whole-grain cereals and breads, oatmeal, beans, brown rice, citrus fruits, and apples.     Eat lean proteins. Heart-healthy proteins include seafood, lean meats and poultry, eggs, beans, peas, nuts, seeds, and soy products.     Limit drinks and foods with added sugar. These include candy, desserts, and soda pop.   Heart-healthy lifestyle    If your doctor recommends it, get more exercise. For many people, walking is a good choice. Or you may want to swim, bike, or do other activities. Bit by bit, increase the time you're active every day. Try for at least 30 minutes on most days of the week.     Try to quit or cut back on using tobacco and other nicotine products. This includes smoking and vaping. If you need help quitting, talk to your doctor about

## 2024-06-04 NOTE — TELEPHONE ENCOUNTER
Please let patient know that I have placed a referral for Urology, Dr. Sunil Lyn, for the blood in her urine.  As this is the second incidence of hematuria this year it would be good for her to follow-up.  Thank you   Yes, get tested

## 2024-06-04 NOTE — PROGRESS NOTES
Prior to Visit Medications    Medication Sig Taking? Authorizing Provider   meclizine (ANTIVERT) 25 MG tablet TAKE 1 TABLET BY MOUTH THREE TIMES DAILY AS NEEDED Yes Ahmet Regan, DO   ondansetron (ZOFRAN) 8 MG tablet Take 1 tablet by mouth every 6 hours as needed for Nausea or Vomiting Yes Ahmet Regan, DO   rosuvastatin (CRESTOR) 20 MG tablet TAKE 1 TABLET BY MOUTH IN THE MORNING Yes Ag Whitlock MD   pantoprazole (PROTONIX) 40 MG tablet TAKE 1 TABLET BY MOUTH EVERY DAY Yes Ahmet Regan,    Aspirin-Acetaminophen-Caffeine (EXCEDRIN PO) Take by mouth Yes Gino Herman MD   famotidine (PEPCID) 40 MG tablet TAKE 1 TABLET BY MOUTH TWICE DAILY Yes Gino Herman MD   aspirin EC 81 MG EC tablet Take 1 tablet by mouth in the morning. Yes Ag Whitlock MD   HYDROcodone-acetaminophen (NORCO) 7.5-325 MG per tablet  Yes ProviderGino MD   PREMARIN 0.625 MG/GM vaginal cream  Yes ProviderGino MD   Cholecalciferol (VITAMIN D3) 50 MCG (2000 UT) CAPS Take by mouth Yes ProviderGino MD   Multiple Vitamins-Minerals (THERAPEUTIC MULTIVITAMIN-MINERALS) tablet Take 1 tablet by mouth daily Yes Gino Herman MD   Fexofenadine HCl (ALLEGRA PO) Take 180 mg by mouth as needed Yes ProviderGino MD   medical marijuana Take by mouth as needed. Yes ProviderGino MD       CareTeam (Including outside providers/suppliers regularly involved in providing care):   Patient Care Team:  Ahmet Regan DO as PCP - General (Family Medicine)  Ahmet Regan DO as PCP - Empaneled Provider     Reviewed and updated this visit:  Tobacco  Allergies  Meds  Problems  Med Hx  Surg Hx  Soc Hx  Fam Hx         Pt will do labs this morning.

## 2024-06-05 LAB
25(OH)D3 SERPL-MCNC: 134.5 NG/ML
MAGNESIUM SERPL-MCNC: 2.1 MG/DL (ref 1.8–2.4)

## 2024-06-07 DIAGNOSIS — A49.8 ENTEROCOCCUS FAECALIS INFECTION: Primary | ICD-10-CM

## 2024-06-07 LAB
BACTERIA UR CULT: ABNORMAL
BACTERIA UR CULT: ABNORMAL
ORGANISM: ABNORMAL
ORGANISM: ABNORMAL

## 2024-06-07 RX ORDER — NITROFURANTOIN 25; 75 MG/1; MG/1
100 CAPSULE ORAL 2 TIMES DAILY
Qty: 14 CAPSULE | Refills: 0 | Status: SHIPPED | OUTPATIENT
Start: 2024-06-07 | End: 2024-06-14

## 2024-06-13 NOTE — TELEPHONE ENCOUNTER
LOV 6/4/2024  Future Appointments   Date Time Provider Department Center   6/21/2024 10:30 AM ANYA [facility] STRESS LAB [facility]   8/7/2024 10:00 AM YAIMA [facility] ECHO & VASCULAR [facility]   10/1/2024  9:00 AM Kannan Sandoval MD [facility] Neurology -   12/3/2024 10:00 AM Beverly Jackman, APRN - [facility]   12/4/2024  8:40 AM Ahmet Regan, DO [facility] [place]

## 2024-06-14 ENCOUNTER — TELEPHONE (OUTPATIENT)
Dept: CARDIOLOGY CLINIC | Age: 64
End: 2024-06-14

## 2024-06-14 DIAGNOSIS — E78.2 MIXED HYPERLIPIDEMIA: Primary | ICD-10-CM

## 2024-06-14 RX ORDER — ONDANSETRON HYDROCHLORIDE 8 MG/1
8 TABLET, FILM COATED ORAL EVERY 6 HOURS PRN
Qty: 30 TABLET | Refills: 0 | Status: SHIPPED | OUTPATIENT
Start: 2024-06-14

## 2024-06-14 RX ORDER — MECLIZINE HYDROCHLORIDE 25 MG/1
TABLET ORAL
Qty: 90 TABLET | Refills: 0 | Status: SHIPPED | OUTPATIENT
Start: 2024-06-14

## 2024-06-14 NOTE — TELEPHONE ENCOUNTER
Noted calcium score 338.  95th percentile for her age.  Recommend aggressive therapies.  She is taking aspirin.  Ensure compliance.  She is taking 20 mg Crestor.  Would recommend fasting lipids repeat and titration of this to 40 if needed for goal LDL less than 70.  If the patient is having any symptoms of chest discomfort or shortness of breath with exertion we should be seeing her back in clinic to reassess.

## 2024-06-14 NOTE — TELEPHONE ENCOUNTER
Received fax from [employer] with 6/12/24 CT calcium score. Results scanned into media for review.

## 2024-06-17 RX ORDER — ONDANSETRON HYDROCHLORIDE 8 MG/1
8 TABLET, FILM COATED ORAL EVERY 6 HOURS PRN
Qty: 90 TABLET | Refills: 2 | OUTPATIENT
Start: 2024-06-17

## 2024-06-17 NOTE — TELEPHONE ENCOUNTER
Patient states the RX for Zofran should be for 90 tablets, as she takes this 3 times per day. She hasn't picked up the RX sent 06/14, because she wants it changed to 90 tablets. Her insurance will only pay for it once per month. She wants to make sure it's for the correct amount.

## 2024-06-17 NOTE — TELEPHONE ENCOUNTER
I spoke with patient. Labs ordered. She is not having any chest pain or SOB. She states she is having palpitations at rest. She has the same symptoms in 20222 and wore a cardiac event monitor.     Monitor 8/2022

## 2024-06-18 RX ORDER — ONDANSETRON HYDROCHLORIDE 8 MG/1
8 TABLET, FILM COATED ORAL EVERY 6 HOURS PRN
Qty: 90 TABLET | Refills: 1 | Status: SHIPPED | OUTPATIENT
Start: 2024-06-18

## 2024-06-18 NOTE — TELEPHONE ENCOUNTER
Pt called because she needs a corrected Rx for her Zofran sent to the pharmacy. Per her insurance she can only get it filled once a month which means she needs a quantity of 90 Zofran sent to [facility]. Per pt she takes it 3 times a day.     Please cancel rx that was sent on 6/14 and send a new one.

## 2024-06-18 NOTE — TELEPHONE ENCOUNTER
Agree w/ plan as per Jet  She has stress test scheduled next week which will better tell us if any of the blockage noted on the Ca score is significant

## 2024-06-21 ENCOUNTER — TELEPHONE (OUTPATIENT)
Dept: CARDIOLOGY CLINIC | Age: 64
End: 2024-06-21

## 2024-06-21 DIAGNOSIS — E78.2 MIXED HYPERLIPIDEMIA: ICD-10-CM

## 2024-06-21 NOTE — TELEPHONE ENCOUNTER
----- Message from Ag Whitlock MD sent at 6/21/2024 12:56 PM EDT -----  Please notify patient that their stress test is normal  Indicates no sig blockage

## 2024-06-22 LAB
ALBUMIN SERPL-MCNC: 4.8 G/DL (ref 3.4–5)
ALBUMIN/GLOB SERPL: 1.9 {RATIO} (ref 1.1–2.2)
ALP SERPL-CCNC: 109 U/L (ref 40–129)
ALT SERPL-CCNC: 16 U/L (ref 10–40)
ANION GAP SERPL CALCULATED.3IONS-SCNC: 12 MMOL/L (ref 3–16)
AST SERPL-CCNC: 25 U/L (ref 15–37)
BILIRUB SERPL-MCNC: 0.3 MG/DL (ref 0–1)
BUN SERPL-MCNC: 20 MG/DL (ref 7–20)
CALCIUM SERPL-MCNC: 10.5 MG/DL (ref 8.3–10.6)
CHLORIDE SERPL-SCNC: 103 MMOL/L (ref 99–110)
CHOLEST SERPL-MCNC: 159 MG/DL (ref 0–199)
CO2 SERPL-SCNC: 27 MMOL/L (ref 21–32)
CREAT SERPL-MCNC: 0.9 MG/DL (ref 0.6–1.2)
GFR SERPLBLD CREATININE-BSD FMLA CKD-EPI: 72 ML/MIN/{1.73_M2}
GLUCOSE SERPL-MCNC: 88 MG/DL (ref 70–99)
HDLC SERPL-MCNC: 52 MG/DL (ref 40–60)
LDLC SERPL CALC-MCNC: 80 MG/DL
POTASSIUM SERPL-SCNC: 5.3 MMOL/L (ref 3.5–5.1)
PROT SERPL-MCNC: 7.3 G/DL (ref 6.4–8.2)
SODIUM SERPL-SCNC: 142 MMOL/L (ref 136–145)
TRIGL SERPL-MCNC: 135 MG/DL (ref 0–150)
VLDLC SERPL CALC-MCNC: 27 MG/DL

## 2024-06-24 ENCOUNTER — TELEPHONE (OUTPATIENT)
Dept: CARDIOLOGY CLINIC | Age: 64
End: 2024-06-24

## 2024-06-24 DIAGNOSIS — E87.5 HYPERKALEMIA: Primary | ICD-10-CM

## 2024-06-24 NOTE — TELEPHONE ENCOUNTER
----- Message from Ag Whitlock MD sent at 6/24/2024  9:01 AM EDT -----  Please notify patient that their lab results show elevated K. May be lab error. Make sure not eating any high K foods  Repeat BMP this week to make sure K not sig elevated   Chol has gone up a little. Worl on low carb diet

## 2024-07-02 DIAGNOSIS — F41.9 ANXIETY: ICD-10-CM

## 2024-07-02 DIAGNOSIS — M32.9 LUPUS (HCC): ICD-10-CM

## 2024-07-02 DIAGNOSIS — E78.00 ELEVATED LDL CHOLESTEROL LEVEL: ICD-10-CM

## 2024-07-02 DIAGNOSIS — E87.5 HYPERKALEMIA: ICD-10-CM

## 2024-07-02 LAB
ALBUMIN SERPL-MCNC: 4.3 G/DL (ref 3.4–5)
ALBUMIN/GLOB SERPL: 2 {RATIO} (ref 1.1–2.2)
ALP SERPL-CCNC: 99 U/L (ref 40–129)
ALT SERPL-CCNC: 18 U/L (ref 10–40)
ANION GAP SERPL CALCULATED.3IONS-SCNC: 11 MMOL/L (ref 3–16)
AST SERPL-CCNC: 28 U/L (ref 15–37)
BASOPHILS # BLD: 0 K/UL (ref 0–0.2)
BASOPHILS NFR BLD: 0.3 %
BILIRUB SERPL-MCNC: <0.2 MG/DL (ref 0–1)
BUN SERPL-MCNC: 23 MG/DL (ref 7–20)
CALCIUM SERPL-MCNC: 9.5 MG/DL (ref 8.3–10.6)
CHLORIDE SERPL-SCNC: 103 MMOL/L (ref 99–110)
CHOLEST SERPL-MCNC: 154 MG/DL (ref 0–199)
CO2 SERPL-SCNC: 28 MMOL/L (ref 21–32)
CREAT SERPL-MCNC: 1 MG/DL (ref 0.6–1.2)
DEPRECATED RDW RBC AUTO: 13 % (ref 12.4–15.4)
EOSINOPHIL # BLD: 0.1 K/UL (ref 0–0.6)
EOSINOPHIL NFR BLD: 1.9 %
GFR SERPLBLD CREATININE-BSD FMLA CKD-EPI: 63 ML/MIN/{1.73_M2}
GLUCOSE SERPL-MCNC: 88 MG/DL (ref 70–99)
HCT VFR BLD AUTO: 40.1 % (ref 36–48)
HDLC SERPL-MCNC: 47 MG/DL (ref 40–60)
HGB BLD-MCNC: 13.4 G/DL (ref 12–16)
LDLC SERPL CALC-MCNC: 86 MG/DL
LYMPHOCYTES # BLD: 2.9 K/UL (ref 1–5.1)
LYMPHOCYTES NFR BLD: 38.9 %
MCH RBC QN AUTO: 31.5 PG (ref 26–34)
MCHC RBC AUTO-ENTMCNC: 33.4 G/DL (ref 31–36)
MCV RBC AUTO: 94.5 FL (ref 80–100)
MONOCYTES # BLD: 0.5 K/UL (ref 0–1.3)
MONOCYTES NFR BLD: 7.2 %
NEUTROPHILS # BLD: 3.8 K/UL (ref 1.7–7.7)
NEUTROPHILS NFR BLD: 51.7 %
PLATELET # BLD AUTO: 197 K/UL (ref 135–450)
PMV BLD AUTO: 9.1 FL (ref 5–10.5)
POTASSIUM SERPL-SCNC: 4.2 MMOL/L (ref 3.5–5.1)
PROT SERPL-MCNC: 6.5 G/DL (ref 6.4–8.2)
RBC # BLD AUTO: 4.25 M/UL (ref 4–5.2)
SODIUM SERPL-SCNC: 142 MMOL/L (ref 136–145)
TRIGL SERPL-MCNC: 105 MG/DL (ref 0–150)
TSH SERPL DL<=0.005 MIU/L-ACNC: 2.78 UIU/ML (ref 0.27–4.2)
VLDLC SERPL CALC-MCNC: 21 MG/DL
WBC # BLD AUTO: 7.4 K/UL (ref 4–11)

## 2024-07-09 ENCOUNTER — TELEPHONE (OUTPATIENT)
Dept: CARDIOLOGY CLINIC | Age: 64
End: 2024-07-09

## 2024-07-09 NOTE — TELEPHONE ENCOUNTER
CARDIAC CLEARANCE REQUEST    What type of procedure are you having:  Cystoscopy with possible bladder biopsy  Are you taking any blood thinners:  Asprin 81mg,   Type on anesthesia:  general  When is your procedure scheduled for:  07/29/2024  What physician is performing your procedure:  Dr. Richmond Bright  Phone Number:  [phone]  Fax number to send the letter:  [phone]    Pt will need a copy of EKG tracing sent with the clearance.

## 2024-07-22 ENCOUNTER — OFFICE VISIT (OUTPATIENT)
Dept: FAMILY MEDICINE CLINIC | Age: 64
End: 2024-07-22
Payer: MEDICARE

## 2024-07-22 VITALS
DIASTOLIC BLOOD PRESSURE: 95 MMHG | HEART RATE: 86 BPM | SYSTOLIC BLOOD PRESSURE: 146 MMHG | HEIGHT: 62 IN | BODY MASS INDEX: 24.69 KG/M2 | OXYGEN SATURATION: 95 % | WEIGHT: 134.2 LBS

## 2024-07-22 DIAGNOSIS — Z01.818 PREOP EXAMINATION: Primary | ICD-10-CM

## 2024-07-22 PROCEDURE — 99213 OFFICE O/P EST LOW 20 MIN: CPT | Performed by: SURGERY

## 2024-07-22 ASSESSMENT — ENCOUNTER SYMPTOMS
COUGH: 0
NAUSEA: 0
ABDOMINAL PAIN: 0
SHORTNESS OF BREATH: 0
CONSTIPATION: 0
DIARRHEA: 0
SORE THROAT: 0
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
## 2024-07-30 RX ORDER — MECLIZINE HYDROCHLORIDE 25 MG/1
TABLET ORAL
Qty: 90 TABLET | Refills: 0 | Status: SHIPPED | OUTPATIENT
Start: 2024-07-30

## 2024-07-30 NOTE — TELEPHONE ENCOUNTER
Last ov 07/22/2024   Future Appointments   Date Time Provider Department Center   8/7/2024  9:30 AM A [facility] RM 1 [place] [facility]   8/7/2024 10:00 AM YAIMA [facility] ECHO & VASCULAR [facility]   10/1/2024  9:00 AM Kannan Sandoval MD [facility]O Neurology -   12/3/2024 10:00 AM Beverly Jackman, APRN - CNP [facility]   12/4/2024  8:40 AM Ahmet Regan, DO [facility] [place] - [facility]

## 2024-08-07 ENCOUNTER — HOSPITAL ENCOUNTER (OUTPATIENT)
Dept: MAMMOGRAPHY | Age: 64
Discharge: HOME | End: 2024-08-07
Payer: MEDICARE

## 2024-08-07 DIAGNOSIS — Z12.31 ENCOUNTER FOR SCREENING MAMMOGRAM FOR BREAST CANCER: ICD-10-CM

## 2024-08-07 PROCEDURE — 77063 BREAST TOMOSYNTHESIS BI: CPT

## 2024-08-11 DIAGNOSIS — R92.8 ABNORMAL MAMMOGRAM: ICD-10-CM

## 2024-08-11 DIAGNOSIS — N63.0 BREAST NODULE: Primary | ICD-10-CM

## 2024-08-22 ENCOUNTER — HOSPITAL ENCOUNTER (OUTPATIENT)
Dept: WOMENS IMAGING | Age: 64
Discharge: HOME | End: 2024-08-22
Attending: FAMILY MEDICINE
Payer: MEDICARE

## 2024-08-22 DIAGNOSIS — R92.8 ABNORMAL MAMMOGRAM: ICD-10-CM

## 2024-08-22 PROCEDURE — 76642 ULTRASOUND BREAST LIMITED: CPT

## 2024-08-22 PROCEDURE — G0279 TOMOSYNTHESIS, MAMMO: HCPCS

## 2024-08-27 RX ORDER — PANTOPRAZOLE SODIUM 40 MG/1
40 TABLET, DELAYED RELEASE ORAL DAILY
Qty: 90 TABLET | Refills: 1 | Status: SHIPPED | OUTPATIENT
Start: 2024-08-27

## 2024-08-27 NOTE — TELEPHONE ENCOUNTER
LOV 7/22/2024        Future Appointments   Date Time Provider Department Center   10/1/2024  9:00 AM Kannan Sandoval MD [facility]O Neurology -   12/3/2024 10:00 AM Beverly Jackman APRN - CNP [facility]   12/4/2024  8:40 AM Ahmet Regan DO [facility] DEP

## 2024-09-03 RX ORDER — ONDANSETRON 8 MG/1
8 TABLET, FILM COATED ORAL EVERY 6 HOURS PRN
Qty: 90 TABLET | Refills: 0 | Status: SHIPPED | OUTPATIENT
Start: 2024-09-03

## 2024-09-03 RX ORDER — MECLIZINE HYDROCHLORIDE 25 MG/1
TABLET ORAL
Qty: 90 TABLET | Refills: 0 | Status: SHIPPED | OUTPATIENT
Start: 2024-09-03

## 2024-09-03 NOTE — TELEPHONE ENCOUNTER
Future Appointments   Date Time Provider Department Center   10/1/2024  9:00 AM Kannan Sandoval MD [facility] Neurology -   12/3/2024 10:00 AM Beverly Jackman APRN - CNP [facility]   12/4/2024  8:40 AM Ahmet Regan DO [facility] DEP     LOV 6/4/2024

## 2024-10-01 ENCOUNTER — OFFICE VISIT (OUTPATIENT)
Dept: NEUROLOGY | Age: 64
End: 2024-10-01
Payer: MEDICARE

## 2024-10-01 VITALS
BODY MASS INDEX: 23.92 KG/M2 | SYSTOLIC BLOOD PRESSURE: 126 MMHG | RESPIRATION RATE: 14 BRPM | HEIGHT: 62 IN | DIASTOLIC BLOOD PRESSURE: 82 MMHG | WEIGHT: 130 LBS | OXYGEN SATURATION: 98 % | HEART RATE: 90 BPM

## 2024-10-01 DIAGNOSIS — R41.3 MEMORY LOSS: Primary | ICD-10-CM

## 2024-10-01 DIAGNOSIS — G44.321 INTRACTABLE CHRONIC POST-TRAUMATIC HEADACHE: ICD-10-CM

## 2024-10-01 PROCEDURE — 99204 OFFICE O/P NEW MOD 45 MIN: CPT | Performed by: PSYCHIATRY & NEUROLOGY

## 2024-10-01 RX ORDER — UBROGEPANT 100 MG/1
TABLET ORAL
COMMUNITY

## 2024-10-01 NOTE — PATIENT INSTRUCTIONS
YOU MUST CONFIRM YOUR APPOINTMENT 1 DAY PRIOR OR IT WILL BE CANCELLED!!   Our office will call you 3 times the day prior to your appointment in an attempt to confirm.  Please return our call ASAP or confirm your appt through [employer] no later than 3 pm the day before your appointment.  If we do not hear back from you by 3 pm to confirm, your appointment will be cancelled & someone will be added into that slot from our wait list.         EEG PREP:  1. Patient should take seizure medicine, as prescribed, on the day of the test  2. Patient must NOT have more than 5 hours of sleep prior to the EEG  3. Patient should have CLEAN hair, no hairspray, gel, cream rinse, hair pins, or chemical treatments within 48 hours prior to EEG  4. NO caffeine, pain medications or sedatives on the day of the test (TAKE SEIZURE MEDS!)  5. Arrive 30 minutes prior to appointment time (check in at Registration Desk on 1st floor of hospital main entrance - by the [employer])  6. Procedure will last 60-90 minutes.

## 2024-10-01 NOTE — PROGRESS NOTES
(PROTONIX) 40 MG tablet TAKE 1 TABLET BY MOUTH EVERY DAY 90 tablet 1    rosuvastatin (CRESTOR) 20 MG tablet TAKE 1 TABLET BY MOUTH IN THE MORNING 90 tablet 3    Aspirin-Acetaminophen-Caffeine (EXCEDRIN PO) Take by mouth      famotidine (PEPCID) 40 MG tablet TAKE 1 TABLET BY MOUTH TWICE DAILY      aspirin EC 81 MG EC tablet Take 1 tablet by mouth in the morning. 90 tablet 1    HYDROcodone-acetaminophen (NORCO) 7.5-325 MG per tablet       PREMARIN 0.625 MG/GM vaginal cream       Cholecalciferol (VITAMIN D3) 50 MCG (2000 UT) CAPS Take by mouth      Multiple Vitamins-Minerals (THERAPEUTIC MULTIVITAMIN-MINERALS) tablet Take 1 tablet by mouth daily      Fexofenadine HCl (ALLEGRA PO) Take 180 mg by mouth as needed      medical marijuana Take by mouth as needed.      UBRELVY 100 MG TABS TAKE ONE TABLET BY MOUTH AT ONSET OF HEADACHE. MAY REPEAT X1 2 HOURS LATER IF NEEDED. DO NOT EXCEED 200/24 HOUR PERIOD       No current facility-administered medications for this visit.       Allergies:    Allergies as of 10/01/2024 - Fully Reviewed 10/01/2024   Allergen Reaction Noted    Methadone  01/13/2021    Carisoprodol Nausea And Vomiting 02/25/2021        Social history:     reports that she has been smoking cigarettes. She has a 6.3 pack-year smoking history. She has been exposed to tobacco smoke. She has never used smokeless tobacco. She reports that she does not currently use alcohol. She reports current drug use. Frequency: 14.00 times per week. Drug: Marijuana (Weed).     Family history:    Family History   Problem Relation Age of Onset    Heart Attack Mother     Arthritis Mother     High Blood Pressure Mother     High Cholesterol Mother     Osteoarthritis Mother     Alzheimer's Disease Father     Heart Disease Father     Atrial Fibrillation Father     Arthritis Father     Cancer Maternal Grandmother         Stomach    Other Maternal Grandfather         tragic injury    Cancer Maternal Grandfather         Prostate    Alzheimer's

## 2024-10-03 ENCOUNTER — HOSPITAL ENCOUNTER (OUTPATIENT)
Dept: NEUROLOGY | Age: 64
Discharge: HOME | End: 2024-10-03
Payer: MEDICARE

## 2024-10-03 DIAGNOSIS — G44.321 INTRACTABLE CHRONIC POST-TRAUMATIC HEADACHE: ICD-10-CM

## 2024-10-03 DIAGNOSIS — R41.3 MEMORY LOSS: ICD-10-CM

## 2024-10-03 PROCEDURE — 95813 EEG EXTND MNTR 61-119 MIN: CPT

## 2024-10-03 PROCEDURE — 95813 EEG EXTND MNTR 61-119 MIN: CPT | Performed by: PSYCHIATRY & NEUROLOGY

## 2024-10-15 ENCOUNTER — HOSPITAL ENCOUNTER (OUTPATIENT)
Dept: MRI IMAGING | Age: 64
Discharge: HOME | End: 2024-10-15
Attending: PSYCHIATRY & NEUROLOGY
Payer: MEDICARE

## 2024-10-15 DIAGNOSIS — G44.321 INTRACTABLE CHRONIC POST-TRAUMATIC HEADACHE: ICD-10-CM

## 2024-10-15 DIAGNOSIS — R41.3 MEMORY LOSS: ICD-10-CM

## 2024-10-15 PROCEDURE — 70551 MRI BRAIN STEM W/O DYE: CPT

## 2024-10-21 RX ORDER — MECLIZINE HYDROCHLORIDE 25 MG/1
TABLET ORAL
Qty: 90 TABLET | Refills: 0 | Status: SHIPPED | OUTPATIENT
Start: 2024-10-21

## 2024-10-29 ENCOUNTER — OFFICE VISIT (OUTPATIENT)
Dept: NEUROLOGY | Age: 64
End: 2024-10-29
Payer: MEDICARE

## 2024-10-29 VITALS
HEIGHT: 62 IN | WEIGHT: 130 LBS | BODY MASS INDEX: 23.92 KG/M2 | DIASTOLIC BLOOD PRESSURE: 68 MMHG | SYSTOLIC BLOOD PRESSURE: 112 MMHG | HEART RATE: 64 BPM | RESPIRATION RATE: 16 BRPM | OXYGEN SATURATION: 98 %

## 2024-10-29 DIAGNOSIS — R41.3 MEMORY LOSS: ICD-10-CM

## 2024-10-29 DIAGNOSIS — G44.321 INTRACTABLE CHRONIC POST-TRAUMATIC HEADACHE: Primary | ICD-10-CM

## 2024-10-29 PROCEDURE — 99214 OFFICE O/P EST MOD 30 MIN: CPT | Performed by: PSYCHIATRY & NEUROLOGY

## 2024-10-29 RX ORDER — GABAPENTIN 100 MG/1
CAPSULE ORAL
Qty: 90 CAPSULE | Refills: 0 | Status: SHIPPED | OUTPATIENT
Start: 2024-10-29 | End: 2024-12-28

## 2024-10-29 NOTE — PATIENT INSTRUCTIONS

## 2024-10-29 NOTE — PROGRESS NOTES
history:    Family History   Problem Relation Age of Onset    Heart Attack Mother     Arthritis Mother     High Blood Pressure Mother     High Cholesterol Mother     Osteoarthritis Mother     Alzheimer's Disease Father     Heart Disease Father     Atrial Fibrillation Father     Arthritis Father     Cancer Maternal Grandmother         Stomach    Other Maternal Grandfather         tragic injury    Cancer Maternal Grandfather         Prostate    Alzheimer's Disease Paternal Grandmother     Cancer Paternal Grandfather         prostate    Prostate Cancer Paternal Grandfather         Review of system:  No chest pain, shortness of breath, palpitation, cough, fever, abdominal pain, vomiting, diarrhea, dysuria, vertigo, joint pain, change in speech/vision or new onset of weakness/numbness. Remaining as per HPI.      /68 (Site: Right Upper Arm, Position: Sitting, Cuff Size: Small Adult)   Pulse 64   Resp 16   Ht 1.575 m (5' 2\")   Wt 59 kg (130 lb)   LMP 02/25/1998 (Approximate)   SpO2 98% Comment: RA  BMI 23.78 kg/m²     Neurological examination:  MENTAL STATUS: AAOx3  LANG/SPEECH: Fluent, intact naming, repetition & comprehension  CRANIAL NERVES:    II: Pupils equal and reactive, no RAPD, normal visual field and fundus    III, IV, VI: EOM intact, no gaze preference or deviation    V: normal    VII: no facial asymmetry    VIII: normal hearing to speech  MOTOR: 5/5 in both upper and lower extremities  REFLEXES: 2/4 throughout, bilateral flexor planters  SENSORY: Normal to touch, temperature & pin prick in all extremiteis  COORD: Normal finger to nose and heel to shin, no tremor, no dysmetria    Attention deficit.    Imaging, procedure, and laboratory data:   I reviewed the MRI brain and EEG.    Impression:      ICD-10-CM    1. Intractable chronic post-traumatic headache  G44.321       2. Memory loss  R41.3           There is no clear evidence of secondarily dementia or neurodegenerative dementia from the MRI brain

## 2024-11-01 RX ORDER — FAMOTIDINE 40 MG/1
40 TABLET, FILM COATED ORAL 2 TIMES DAILY
Qty: 180 TABLET | Refills: 1 | Status: SHIPPED | OUTPATIENT
Start: 2024-11-01

## 2024-11-01 NOTE — TELEPHONE ENCOUNTER
LOV 6/4/2024  Future Appointments   Date Time Provider Department Center   12/3/2024 10:00 AM Beverly Jackman, ANEL - CNP [facility]   12/4/2024  8:40 AM [facility]   2/3/2025  9:30 AM Kannan Sandoval MD AND NE[facility]O Neurology -

## 2024-11-01 NOTE — TELEPHONE ENCOUNTER
Called & spoke to pt  Future Appointments   Date Time Provider Department Center   12/3/2024 10:00 AM Beverly Jackman, APRN - CNP [facility]   12/4/2024  8:40 AM Ahmet Regan DO [facility]   2/3/2025  9:30 AM Kannan Sandoval MD AND NEURO Neurology -     She is still currently taking  She ran out last week

## 2024-11-05 ENCOUNTER — TELEPHONE (OUTPATIENT)
Dept: NEUROLOGY | Age: 64
End: 2024-11-05

## 2024-11-05 NOTE — TELEPHONE ENCOUNTER
Per Dr. Garcia: She may need to keep medication at least 2 weeks.  Therefore, we can document this to her insurance if she can not continue that.  Nausea and cramp are not common SE from [facility].    Pt informed.      
Pt called to say Gabapentin is giving her extreme nausea and cramps    Possible to prescribe alternatives?    Call in to [facility] on [address].         
Pt has been on the medication for 6 days  
Dosing Month 1 (Required For Cumulative Dosing): 30mg BID
Hypertriglyceridemia Monitoring: I explained this is common when taking isotretinoin. We will monitor closely.
Is Cheilitis Present?: Yes - Normal Treatment
Show Text Field For Brand Names Of Contraception?: Yes
Dosing Month 8 (Required For Cumulative Dosing): 40mg Daily
Add High Risk Medication Management Associated Diagnosis?: No
Myalgia Monitoring: I explained this is common when taking isotretinoin. If this worsens they will contact us.
Retinoid Dermatitis Normal Treatment: I recommended more frequent application of Cetaphil or CeraVe to the areas of dermatitis.
Detail Level: Zone
Counseling Text: I reviewed the side effect in detail. Patient should get monthly blood tests, not donate blood, not drive at night if vision affected, and not share medication.
Myalgia Treatment: I explained this is common when taking isotretinoin. If this worsens they will contact us. They may try OTC ibuprofen.
Nosebleeds Normal Treatment: I explained this is common when taking isotretinoin. I recommended saline mist in each nostril multiple times a day. If this worsens they will contact us.
Retinoid Dermatitis Aggressive Treatment: I recommended more frequent application of Cetaphil or CeraVe to the areas of dermatitis. I also prescribed a topical steroid for twice daily use until the dermatitis resolves.
What Is The Patient's Gender: Female
Cheilitis Normal Treatment: I recommended application of Vaseline or Aquaphor numerous times a day (as often as every hour) and before going to bed. Recommend OTC HCC 1% cream if needed.
Cheilitis Aggressive Treatment: I recommended application of Vaseline or Aquaphor numerous times a day (as often as every hour) and before going to bed. I also prescribed a topical steroid for twice daily use.
Most Recent Beta Hcg (Optional): upt negative
Female Pregnancy Counseling Text: Female patients should also be on two forms of birth control while taking this medication and for one month after their last dose.
Xerosis Normal Treatment: I recommended application of Cetaphil or CeraVe numerous times a day and before going to bed to all dry areas. Recommend OTC HCC 1% cream if needed.
Xerosis Aggressive Treatment: I recommended application of Cetaphil or CeraVe numerous times a day and before going to bed to all dry areas. I also prescribed a topical steroid for twice daily use.
Headache Monitoring: I recommended monitoring the headaches for now. There is no evidence of increased intracranial pressure. They were instructed to call if the headaches are worsening.
Use Therapeutic Ranged Or Therapeutic Target: please select Range or Target
Dosing Month 5 (Required For Cumulative Dosing): 30mg Daily
Pounds Preamble Statement (Weight Entered In Details Tab): Reported Weight in pounds:
Kilograms Preamble Statement (Weight Entered In Details Tab): Reported Weight in kilograms:
Most Recent Triglycerides (Optional): 141
Patient Weight (Optional But Required For Cumulative Dose-Numbers And Decimals Only): 155
Lower Range (In Mg/Kg): 120
Cheilitis Normal Treatment: I recommended application of Vaseline or Aquaphor numerous times a day (as often as every hour) and before going to bed.
Female Completion Statement: After discussing her treatment course we decided to discontinue isotretinoin therapy at this time. I explained that she would need to continue her birth control methods for at least one month after the last dosage. She should also get a pregnancy test one month after the last dose. She shouldn't donate blood for one month after the last dose. She should call with any new symptoms of depression.
Weight Units: pounds
Most Recent Cholesterol (Optional): 202
Male Completion Statement: After discussing his treatment course we decided to discontinue isotretinoin therapy at this time. He shouldn't donate blood for one month after the last dose. He should call with any new symptoms of depression.
Upper Range (In Mg/Kg): 150
Xerosis Normal Treatment: I recommended application of Cetaphil or CeraVe numerous times a day going to bed to all dry areas.
Months Of Therapy Completed: 8
Nosebleeds Normal Treatment: I explained this is common when taking isotretinoin. I recommended saline mist / gel in each nostril multiple times a day. If this worsens they will contact us.
Target Cumulative Dosage (In Mg/Kg): 135
Xerosis Aggressive Treatment: I recommended application of Cetaphil or CeraVe numerous times a day going to bed to all dry areas. I also prescribed a topical steroid for twice daily use.

## 2024-12-03 ENCOUNTER — OFFICE VISIT (OUTPATIENT)
Dept: CARDIOLOGY CLINIC | Age: 64
End: 2024-12-03
Payer: MEDICARE

## 2024-12-03 VITALS
OXYGEN SATURATION: 97 % | WEIGHT: 135 LBS | SYSTOLIC BLOOD PRESSURE: 128 MMHG | HEART RATE: 90 BPM | DIASTOLIC BLOOD PRESSURE: 74 MMHG | BODY MASS INDEX: 24.84 KG/M2 | HEIGHT: 62 IN

## 2024-12-03 DIAGNOSIS — R00.2 PALPITATIONS: ICD-10-CM

## 2024-12-03 DIAGNOSIS — L93.2 CUTANEOUS LUPUS ERYTHEMATOSUS: ICD-10-CM

## 2024-12-03 DIAGNOSIS — I25.10 CORONARY ARTERY CALCIFICATION SEEN ON CAT SCAN: ICD-10-CM

## 2024-12-03 DIAGNOSIS — E78.2 MIXED HYPERLIPIDEMIA: Primary | ICD-10-CM

## 2024-12-03 PROBLEM — M32.8 OTHER FORMS OF SYSTEMIC LUPUS ERYTHEMATOSUS (HCC): Status: ACTIVE | Noted: 2022-06-27

## 2024-12-03 PROCEDURE — 99214 OFFICE O/P EST MOD 30 MIN: CPT | Performed by: NURSE PRACTITIONER

## 2024-12-03 RX ORDER — ASPIRIN 81 MG/1
81 TABLET ORAL DAILY
Qty: 90 TABLET | Refills: 3 | Status: SHIPPED | OUTPATIENT
Start: 2024-12-03

## 2024-12-03 RX ORDER — ROSUVASTATIN CALCIUM 20 MG/1
20 TABLET, COATED ORAL DAILY
Qty: 90 TABLET | Refills: 3 | Status: SHIPPED | OUTPATIENT
Start: 2024-12-03

## 2024-12-03 NOTE — PROGRESS NOTES
atherosclerotic plaque burden.     Plain exercise GXT stress 6/21/2024:     ECG: The stress ECG was negative for ischemia.    Stress Test: A Danilo protocol stress test was performed. The patient was stressed for 3 min and 16 sec. The patient reported leg pain, lightheadedness and palpitations during the stress test. Symptoms began during stress and ended during recovery. The test was stopped because the patient experienced leg pain and palpitations.    Vascular screening 8/7/2024:     Right side findings: Resting YVES is 1.16. This is within the normal range.    Left side findings: Resting YVES is 1.06. This is within the normal range.    Mild plaque in the right internal carotid artery.    Mild plaque in the left internal carotid artery.     EKG: No evidence of A-fib  Carotid: Presence of mild atherosclerotic plaque of no overall significance is noted in the bilateral internal carotid artery.  YVES: WNL  Aorta: WNL     I appreciate the opportunity of cooperating in the care of this individual.    Beverly Jackman, ANEL - CNP, 12/3/2024, 9:59 AM
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
## 2024-12-03 NOTE — PATIENT INSTRUCTIONS
No change in the aspirin or rosuvastatin  Let us know if the palpitations become limiting and want to consider medication treatment  No need for other testing at this time  Follow up with Dr. Whitlock in 6 months

## 2024-12-04 ENCOUNTER — OFFICE VISIT (OUTPATIENT)
Dept: FAMILY MEDICINE CLINIC | Age: 64
End: 2024-12-04

## 2024-12-04 VITALS
HEART RATE: 90 BPM | DIASTOLIC BLOOD PRESSURE: 75 MMHG | WEIGHT: 137.6 LBS | TEMPERATURE: 98.7 F | SYSTOLIC BLOOD PRESSURE: 124 MMHG | RESPIRATION RATE: 16 BRPM | BODY MASS INDEX: 25.16 KG/M2 | OXYGEN SATURATION: 96 %

## 2024-12-04 DIAGNOSIS — K21.9 GASTROESOPHAGEAL REFLUX DISEASE, UNSPECIFIED WHETHER ESOPHAGITIS PRESENT: Primary | ICD-10-CM

## 2024-12-04 DIAGNOSIS — R51.9 CHRONIC DAILY HEADACHE: ICD-10-CM

## 2024-12-04 DIAGNOSIS — M54.2 NECK PAIN: ICD-10-CM

## 2024-12-04 ASSESSMENT — PATIENT HEALTH QUESTIONNAIRE - PHQ9
SUM OF ALL RESPONSES TO PHQ9 QUESTIONS 1 & 2: 0
SUM OF ALL RESPONSES TO PHQ QUESTIONS 1-9: 0
1. LITTLE INTEREST OR PLEASURE IN DOING THINGS: NOT AT ALL
2. FEELING DOWN, DEPRESSED OR HOPELESS: NOT AT ALL

## 2024-12-10 ENCOUNTER — TELEMEDICINE (OUTPATIENT)
Age: 64
End: 2024-12-10
Payer: MEDICARE

## 2024-12-10 ENCOUNTER — TELEPHONE (OUTPATIENT)
Age: 64
End: 2024-12-10

## 2024-12-10 DIAGNOSIS — R10.2 SUPRAPUBIC ABDOMINAL PAIN: Primary | ICD-10-CM

## 2024-12-10 PROCEDURE — 99213 OFFICE O/P EST LOW 20 MIN: CPT | Performed by: NURSE PRACTITIONER

## 2024-12-10 ASSESSMENT — ENCOUNTER SYMPTOMS
BLOOD IN STOOL: 0
ABDOMINAL PAIN: 1
BACK PAIN: 1
NAUSEA: 0
DIARRHEA: 0
VOMITING: 0
CONSTIPATION: 0

## 2024-12-10 NOTE — TELEPHONE ENCOUNTER
Please assist patient with obtaining urine testing. If you need to cancel and reorder the testing I have ordered, please be sure to include me on the results. Thank you!

## 2024-12-10 NOTE — PROGRESS NOTES
Lady Au (:  1960) is a Established patient, here for evaluation of the following:    Other (Pressure when urination)       Assessment & Plan:  Below is the assessment and plan developed based on review of pertinent history, physical exam, labs, studies, and medications.  1. Suprapubic abdominal pain  -     POCT Urinalysis no Micro; Future  -     Culture, Urine; Future  Will treat as indicated by UA. Patient is already established with urology and nephrology for protein and micro hematuria.  The patient would benefit from future follow up with their usual PCP. As of the end of their Virtualist Visit today, follow up visit status is as follows: Patient to follow up as previously instructed by PCP    Return if symptoms worsen or fail to improve.    Subjective: no burning or discharge  Saturday Lady developed tenderness in her suprapubic area and pressure with urinating. She has chronic back pain. Denies fever, chills, odor, blood, discharge, frequency and burning. No constipation or irregularity of BM's. Appetite is normal.       Review of Systems   Constitutional:  Negative for chills, diaphoresis, fatigue and fever.   Gastrointestinal:  Positive for abdominal pain. Negative for blood in stool, constipation, diarrhea, nausea and vomiting.   Genitourinary:  Positive for difficulty urinating (pressure). Negative for decreased urine volume, dysuria, flank pain, frequency, hematuria and urgency.   Musculoskeletal:  Positive for back pain (chronic).       Objective:  Patient-Reported Vitals  No data recorded         12/10/2024     4:45 PM   Patient-Reported Vitals   Patient-Reported Weight 130   Patient-Reported Height 5'2\"   Patient-Reported Systolic 144 mmHg   Patient-Reported Diastolic 86 mmHg   Patient-Reported Pulse 81   Patient-Reported Temperature 97.9        Physical Exam:  [INSTRUCTIONS:  \"[x]\" Indicates a positive item  \"[]\" Indicates a negative item  -- DELETE ALL ITEMS NOT

## 2024-12-11 ENCOUNTER — NURSE ONLY (OUTPATIENT)
Dept: FAMILY MEDICINE CLINIC | Age: 64
End: 2024-12-11
Payer: MEDICARE

## 2024-12-11 DIAGNOSIS — R10.2 SUPRAPUBIC ABDOMINAL PAIN: ICD-10-CM

## 2024-12-11 LAB
APPEARANCE FLUID: NORMAL
BILIRUBIN, POC: NORMAL
BLOOD URINE, POC: NORMAL
CLARITY, POC: NORMAL
COLOR, POC: NORMAL
GLUCOSE URINE, POC: NORMAL MG/DL
KETONES, POC: NORMAL MG/DL
LEUKOCYTE EST, POC: NORMAL
NITRITE, POC: NORMAL
PH, POC: 7
PROTEIN, POC: NORMAL MG/DL
SPECIFIC GRAVITY, POC: 1.02
UROBILINOGEN, POC: NORMAL MG/DL

## 2024-12-11 PROCEDURE — 81002 URINALYSIS NONAUTO W/O SCOPE: CPT | Performed by: NURSE PRACTITIONER

## 2024-12-12 LAB
BACTERIA UR CULT: ABNORMAL
ORGANISM: ABNORMAL

## 2025-01-08 ENCOUNTER — OFFICE VISIT (OUTPATIENT)
Dept: ORTHOPEDIC SURGERY | Age: 65
End: 2025-01-08
Payer: MEDICARE

## 2025-01-08 VITALS — HEIGHT: 62 IN | WEIGHT: 137 LBS | BODY MASS INDEX: 25.21 KG/M2

## 2025-01-08 DIAGNOSIS — M54.2 CHRONIC NECK PAIN: ICD-10-CM

## 2025-01-08 DIAGNOSIS — G89.29 CHRONIC NECK PAIN: ICD-10-CM

## 2025-01-08 DIAGNOSIS — M79.18 CERVICAL MYOFASCIAL PAIN SYNDROME: ICD-10-CM

## 2025-01-08 DIAGNOSIS — M54.12 CERVICAL RADICULITIS: Primary | ICD-10-CM

## 2025-01-08 PROCEDURE — 99204 OFFICE O/P NEW MOD 45 MIN: CPT | Performed by: PHYSICIAN ASSISTANT

## 2025-01-08 NOTE — PROGRESS NOTES
warranted; we called for Dr. Whitehead op notes           Maude Khan PA-C, MPAS  Board Certified by the [facility]

## 2025-01-10 ENCOUNTER — TELEPHONE (OUTPATIENT)
Dept: ORTHOPEDIC SURGERY | Age: 65
End: 2025-01-10

## 2025-01-10 NOTE — TELEPHONE ENCOUNTER
S/W Lady Au regarding MRI CERVICAL SPINE approval and authorization being valid until 4/8/2025.  Patient was instructed that their MRI needs to be scheduled at  [facility] . The patient was instructed to contact the facility to schedule  at [phone].    A follow up appointment has been scheduled for Wednesday 2/5/2025 at the [place] office.

## 2025-01-13 ENCOUNTER — HOSPITAL ENCOUNTER (OUTPATIENT)
Dept: MRI IMAGING | Age: 65
Discharge: HOME | End: 2025-01-13
Payer: MEDICARE

## 2025-01-13 ENCOUNTER — HOSPITAL ENCOUNTER (OUTPATIENT)
Dept: PHYSICAL THERAPY | Age: 65
Setting detail: THERAPIES SERIES
Discharge: HOME | End: 2025-01-13
Payer: MEDICARE

## 2025-01-13 DIAGNOSIS — M54.2 CHRONIC NECK PAIN: ICD-10-CM

## 2025-01-13 DIAGNOSIS — G89.29 CHRONIC NECK PAIN: ICD-10-CM

## 2025-01-13 DIAGNOSIS — M54.2 NECK PAIN: Primary | ICD-10-CM

## 2025-01-13 DIAGNOSIS — M54.12 CERVICAL RADICULITIS: ICD-10-CM

## 2025-01-13 DIAGNOSIS — M79.18 CERVICAL MYOFASCIAL PAIN SYNDROME: ICD-10-CM

## 2025-01-13 PROCEDURE — 97140 MANUAL THERAPY 1/> REGIONS: CPT

## 2025-01-13 PROCEDURE — 97110 THERAPEUTIC EXERCISES: CPT

## 2025-01-13 PROCEDURE — 72141 MRI NECK SPINE W/O DYE: CPT

## 2025-01-13 PROCEDURE — 97161 PT EVAL LOW COMPLEX 20 MIN: CPT

## 2025-01-13 NOTE — PLAN OF CARE
Increased ROM  [] Progressing: [] Met: [] Not Met: [] Adjusted    Therapist goals for Patient:   Short Term Goals: To be achieved in: 2 weeks  1. Independent in HEP and progression per patient tolerance, in order to prevent re-injury.   [] Progressing: [] Met: [] Not Met: [] Adjusted  2. Patient will have a decrease in pain to <4/10 to facilitate improvement in movement, function, and ADLs as indicated by Functional Deficits.  [] Progressing: [] Met: [] Not Met: [] Adjusted    IF APPLICABLE:  [] Patient to demonstrate independence in wear and care for custom orthotic device. (Only if applicable for orthotic eval)     Long Term Goals: To be achieved in: 8 weeks  1. Disability index score of 40% or less for the Neck Disability Index to assist with reaching prior level of function with activities such as reaching and lifting objects into a cabinet.  [] Progressing: [] Met: [] Not Met: [] Adjusted  2. Patient will demonstrate increased AROM of cervical spine to WNL without pain to allow for proper joint functioning to enable patient to turn head while driving.   [] Progressing: [] Met: [] Not Met: [] Adjusted  3. Patient will demonstrate increased Strength of bilateral UEs to at least 5/5 throughout without pain to allow for proper functional mobility to enable patient to return to social and recreational activities.   [] Progressing: [] Met: [] Not Met: [] Adjusted  4. Patient will return to Independent self care and ADLs without increased symptoms or restriction.   [] Progressing: [] Met: [] Not Met: [] Adjusted  5. Patient will report headache pain less than 2/10 only 5 days a week.  [] Progressing: [] Met: [] Not Met: [] Adjusted       Overall Progression Towards Functional goals/ Treatment Progress Update:  [] Patient is progressing as expected towards functional goals listed.    [] Progression is slowed due to complexities/Impairments listed.  [] Progression has been slowed due to co-morbidities.  [x] Plan just

## 2025-01-16 ENCOUNTER — HOSPITAL ENCOUNTER (OUTPATIENT)
Dept: PHYSICAL THERAPY | Age: 65
Setting detail: THERAPIES SERIES
Discharge: HOME | End: 2025-01-16
Payer: MEDICARE

## 2025-01-16 PROCEDURE — 97140 MANUAL THERAPY 1/> REGIONS: CPT

## 2025-01-16 PROCEDURE — 97012 MECHANICAL TRACTION THERAPY: CPT

## 2025-01-16 PROCEDURE — 97110 THERAPEUTIC EXERCISES: CPT

## 2025-01-16 NOTE — FLOWSHEET NOTE
[facility]- Outpatient Rehabilitation and Therapy [address], [place] [address] office: [phone] fax: [phone]     Physical Therapy Daily Treatment Note        Physical Therapy: TREATMENT/PROGRESS NOTE   Patient: Lady Au (64 y.o. female)   Examination Date: 2025   :  1960 MRN: [id number]   Visit #: 2   Insurance Allowable Auth Needed   TBD [x]Yes    []No    Insurance: Payor: [facility] MEDICARE / Plan: ANTHEM MEDIBLUE ESSENTIAL/PLUS / Product Type: *No Product type* /   Insurance ID: [id number] - (Medicare Managed)  Secondary Insurance (if applicable):    Treatment Diagnosis:     ICD-10-CM    1. Neck pain  M54.2          Medical Diagnosis:  Cervical radiculitis [M54.12]  Chronic neck pain [M54.2, G89.29]  Cervical myofascial pain syndrome [M79.18]   Referring Physician: Maude Kahn,*  PCP: Ahmet Regan DO     Plan of care signed (Y/N):     Date of Patient follow up with Physician:      Plan of Care Report: EVAL today  POC update due: (10 visits /OR AUTH LIMITS, whichever is less)  2025                                             Medical History:  Comorbidities:  Osteoporosis/Osteopenia  Anxiety  COVID-19  Other: High heart rate post COVID  Relevant Medical History:                                          Precautions/ Contra-indications:           Latex allergy:  NO  Pacemaker:    NO  Contraindications for Manipulation: None  Date of Surgery:   Other:    Red Flags:  None    Suicide Screening:   The patient did not verbalize a primary behavioral concern, suicidal ideation, suicidal intent, or demonstrate suicidal behaviors.    Preferred Language for Healthcare:   [x] English       [] other:    SUBJECTIVE EXAMINATION     Patient stated complaint: Patient states that she is a little tender today. She states that she has been doing her exercises. She states that she took two Excedrin before coming.       Test used Initial score  25

## 2025-01-20 ENCOUNTER — HOSPITAL ENCOUNTER (OUTPATIENT)
Dept: PHYSICAL THERAPY | Age: 65
Setting detail: THERAPIES SERIES
Discharge: HOME | End: 2025-01-20
Payer: MEDICARE

## 2025-01-20 PROCEDURE — 97012 MECHANICAL TRACTION THERAPY: CPT

## 2025-01-20 PROCEDURE — 97110 THERAPEUTIC EXERCISES: CPT

## 2025-01-20 PROCEDURE — 97140 MANUAL THERAPY 1/> REGIONS: CPT

## 2025-01-20 NOTE — FLOWSHEET NOTE
[facility]- Outpatient Rehabilitation and Therapy [address], [place] [address] office: [phone] fax: [phone]     Physical Therapy Daily Treatment Note        Physical Therapy: TREATMENT/PROGRESS NOTE   Patient: Lady Au (64 y.o. female)   Examination Date: 2025   :  1960 MRN: [id number]   Visit #: 3   Insurance Allowable Auth Needed   TBD [x]Yes    []No    Insurance: Payor: [facility] MEDICARE / Plan: ANTHEM MEDIBLUE ESSENTIAL/PLUS / Product Type: *No Product type* /   Insurance ID: [id number] - (Medicare Managed)  Secondary Insurance (if applicable):    Treatment Diagnosis:     ICD-10-CM    1. Neck pain  M54.2          Medical Diagnosis:  Cervical radiculitis [M54.12]  Chronic neck pain [M54.2, G89.29]  Cervical myofascial pain syndrome [M79.18]   Referring Physician: Maude Khan,*  PCP: Ahmet Regan DO     Plan of care signed (Y/N):     Date of Patient follow up with Physician:      Plan of Care Report: EVAL today  POC update due: (10 visits /OR AUTH LIMITS, whichever is less)  2025                                             Medical History:  Comorbidities:  Osteoporosis/Osteopenia  Anxiety  COVID-19  Other: High heart rate post COVID  Relevant Medical History:                                          Precautions/ Contra-indications:           Latex allergy:  NO  Pacemaker:    NO  Contraindications for Manipulation: None  Date of Surgery:   Other:    Red Flags:  None    Suicide Screening:   The patient did not verbalize a primary behavioral concern, suicidal ideation, suicidal intent, or demonstrate suicidal behaviors.    Preferred Language for Healthcare:   [x] English       [] other:    SUBJECTIVE EXAMINATION     Patient stated complaint: Patient states that her neck is really tender. She states that she had a hard weekend. She states that she did feel better after traction but after an hour or two returned to her normal sore

## 2025-01-23 ENCOUNTER — HOSPITAL ENCOUNTER (OUTPATIENT)
Dept: PHYSICAL THERAPY | Age: 65
Setting detail: THERAPIES SERIES
Discharge: HOME | End: 2025-01-23
Payer: MEDICARE

## 2025-01-23 PROCEDURE — 97110 THERAPEUTIC EXERCISES: CPT

## 2025-01-23 PROCEDURE — 97140 MANUAL THERAPY 1/> REGIONS: CPT

## 2025-01-23 PROCEDURE — 97012 MECHANICAL TRACTION THERAPY: CPT

## 2025-01-23 NOTE — FLOWSHEET NOTE
[facility]- Outpatient Rehabilitation and Therapy [address], [place] [address] office: [phone] fax: [phone]     Physical Therapy Daily Treatment Note        Physical Therapy: TREATMENT/PROGRESS NOTE   Patient: Lady Au (64 y.o. female)   Examination Date: 2025   :  1960 MRN: [id number]   Visit #: 4   Insurance Allowable Auth Needed   6 visits [x]Yes    []No    Insurance: Payor: [facility] MEDICARE / Plan: ANTHEM MEDIBLUE ESSENTIAL/PLUS / Product Type: *No Product type* /   Insurance ID: [id number] - (Medicare Managed)  Secondary Insurance (if applicable):    Treatment Diagnosis:     ICD-10-CM    1. Neck pain  M54.2          Medical Diagnosis:  Cervical radiculitis [M54.12]  Chronic neck pain [M54.2, G89.29]  Cervical myofascial pain syndrome [M79.18]   Referring Physician: Maude Khan,*  PCP: Ahmet Regan DO     Plan of care signed (Y/N):     Date of Patient follow up with Physician:      Plan of Care Report: NO  POC update due: (10 visits /OR AUTH LIMITS, whichever is less)  2025                                             Medical History:  Comorbidities:  Osteoporosis/Osteopenia  Anxiety  COVID-19  Other: High heart rate post COVID  Relevant Medical History:                                          Precautions/ Contra-indications:           Latex allergy:  NO  Pacemaker:    NO  Contraindications for Manipulation: None  Date of Surgery:   Other:    Red Flags:  None    Suicide Screening:   The patient did not verbalize a primary behavioral concern, suicidal ideation, suicidal intent, or demonstrate suicidal behaviors.    Preferred Language for Healthcare:   [x] English       [] other:    SUBJECTIVE EXAMINATION     Patient stated complaint: Patient states that she still feels very tight.       Test used Initial score  2025   Pain Summary VAS 4-10/10 6-7/10   Functional questionnaire Neck Disability Index 28/50=56%

## 2025-01-27 ENCOUNTER — HOSPITAL ENCOUNTER (OUTPATIENT)
Dept: PHYSICAL THERAPY | Age: 65
Setting detail: THERAPIES SERIES
Discharge: HOME | End: 2025-01-27
Payer: MEDICARE

## 2025-01-27 PROCEDURE — 97140 MANUAL THERAPY 1/> REGIONS: CPT

## 2025-01-27 PROCEDURE — 97110 THERAPEUTIC EXERCISES: CPT

## 2025-01-27 PROCEDURE — 97012 MECHANICAL TRACTION THERAPY: CPT

## 2025-01-27 NOTE — FLOWSHEET NOTE
purpose of modulating pain, promoting relaxation,  increasing ROM, reducing/eliminating soft tissue swelling/inflammation/restriction, improving soft tissue extensibility and allowing for proper ROM for normal function with self care, mobility, lifting and ambulation  (60852) MECHANICAL TRACTION    GOALS     Patient stated goal: Stop headaches; Increased ROM  [] Progressing: [] Met: [] Not Met: [] Adjusted    Therapist goals for Patient:   Short Term Goals: To be achieved in: 2 weeks  1. Independent in HEP and progression per patient tolerance, in order to prevent re-injury.   [] Progressing: [] Met: [] Not Met: [] Adjusted  2. Patient will have a decrease in pain to <4/10 to facilitate improvement in movement, function, and ADLs as indicated by Functional Deficits.  [] Progressing: [] Met: [] Not Met: [] Adjusted    IF APPLICABLE:  [] Patient to demonstrate independence in wear and care for custom orthotic device. (Only if applicable for orthotic eval)     Long Term Goals: To be achieved in: 8 weeks  1. Disability index score of 40% or less for the Neck Disability Index to assist with reaching prior level of function with activities such as reaching and lifting objects into a cabinet.  [] Progressing: [] Met: [] Not Met: [] Adjusted  2. Patient will demonstrate increased AROM of cervical spine to WNL without pain to allow for proper joint functioning to enable patient to turn head while driving.   [] Progressing: [] Met: [] Not Met: [] Adjusted  3. Patient will demonstrate increased Strength of bilateral UEs to at least 5/5 throughout without pain to allow for proper functional mobility to enable patient to return to social and recreational activities.   [] Progressing: [] Met: [] Not Met: [] Adjusted  4. Patient will return to Independent self care and ADLs without increased symptoms or restriction.   [] Progressing: [] Met: [] Not Met: [] Adjusted  5. Patient will report headache pain less than 2/10 only 5 days a

## 2025-01-30 ENCOUNTER — HOSPITAL ENCOUNTER (OUTPATIENT)
Dept: PHYSICAL THERAPY | Age: 65
Setting detail: THERAPIES SERIES
Discharge: HOME | End: 2025-01-30
Payer: MEDICARE

## 2025-01-30 PROCEDURE — 97110 THERAPEUTIC EXERCISES: CPT

## 2025-01-30 PROCEDURE — 97012 MECHANICAL TRACTION THERAPY: CPT

## 2025-01-30 PROCEDURE — 97140 MANUAL THERAPY 1/> REGIONS: CPT

## 2025-01-30 NOTE — PLAN OF CARE
[facility]- Outpatient Rehabilitation and Therapy [address], [place] [address] office: [phone] fax: [phone]     Physical Therapy Recertification Note    Physical Therapy Re-Certification Plan of Care    Dear Maude Khan, *  ,    We had the pleasure of treating the following patient for physical therapy services at [facility]. A summary of our findings can be found in the updated assessment below.  This includes our plan of care.  If you have any questions or concerns regarding these findings, please do not hesitate to contact me at the office phone number checked above.  Thank you for the referral.     Physician Signature:________________________________Date:__________________  By signing above (or electronic signature), therapist's plan is approved by physician      Total Visits: 6     Overall Response to Treatment:  Patient is responding well to treatment and improvement is noted with regards to goals    Recommendation:    [x] Continue PT 2x / wk for 4 weeks.   [] Hold PT, pending MD visit   [] Discharge to [facility]. Follow up with PT or MD PRN.           Physical Therapy: TREATMENT/PROGRESS NOTE   Patient: Lady Au (64 y.o. female)   Examination Date: 2025   :  1960 MRN: [id number]   Visit #: 6   Insurance Allowable Auth Needed   6 visits  Sending for more auth  [x]Yes    []No    Insurance: Payor: [facility] MEDICARE / Plan: ANTHEM MEDIBLUE ESSENTIAL/PLUS / Product Type: *No Product type* /   Insurance ID: [id number] - (Medicare Managed)  Secondary Insurance (if applicable):    Treatment Diagnosis:     ICD-10-CM    1. Neck pain  M54.2          Medical Diagnosis:  Cervical radiculitis [M54.12]  Chronic neck pain [M54.2, G89.29]  Cervical myofascial pain syndrome [M79.18]   Referring Physician: Maude Khan,*  PCP: Ahmet Regan DO     Plan of care signed (Y/N):     Date of Patient follow up with

## 2025-02-05 ENCOUNTER — OFFICE VISIT (OUTPATIENT)
Dept: FAMILY MEDICINE CLINIC | Age: 65
End: 2025-02-05

## 2025-02-05 VITALS
HEART RATE: 62 BPM | SYSTOLIC BLOOD PRESSURE: 117 MMHG | DIASTOLIC BLOOD PRESSURE: 75 MMHG | BODY MASS INDEX: 24.5 KG/M2 | TEMPERATURE: 98.1 F | RESPIRATION RATE: 16 BRPM | WEIGHT: 134 LBS | OXYGEN SATURATION: 96 %

## 2025-02-05 DIAGNOSIS — R53.83 FATIGUE, UNSPECIFIED TYPE: Primary | ICD-10-CM

## 2025-02-05 DIAGNOSIS — R11.0 NAUSEA: ICD-10-CM

## 2025-02-05 DIAGNOSIS — R42 DIZZINESS: ICD-10-CM

## 2025-02-05 DIAGNOSIS — R53.83 FATIGUE, UNSPECIFIED TYPE: ICD-10-CM

## 2025-02-05 LAB
BILIRUBIN, POC: NEGATIVE
BLOOD URINE, POC: NORMAL
CLARITY, POC: NORMAL
COLOR, POC: NORMAL
GLUCOSE URINE, POC: NEGATIVE MG/DL
KETONES, POC: NEGATIVE MG/DL
LEUKOCYTE EST, POC: NEGATIVE
NITRITE, POC: NEGATIVE
PH, POC: 5.5
PROTEIN, POC: NORMAL MG/DL
SPECIFIC GRAVITY, POC: >=1.03
UROBILINOGEN, POC: NORMAL MG/DL

## 2025-02-05 SDOH — ECONOMIC STABILITY: FOOD INSECURITY: WITHIN THE PAST 12 MONTHS, YOU WORRIED THAT YOUR FOOD WOULD RUN OUT BEFORE YOU GOT MONEY TO BUY MORE.: NEVER TRUE

## 2025-02-05 SDOH — ECONOMIC STABILITY: FOOD INSECURITY: WITHIN THE PAST 12 MONTHS, THE FOOD YOU BOUGHT JUST DIDN'T LAST AND YOU DIDN'T HAVE MONEY TO GET MORE.: NEVER TRUE

## 2025-02-05 ASSESSMENT — ENCOUNTER SYMPTOMS
VOMITING: 0
NAUSEA: 1
COUGH: 0
SHORTNESS OF BREATH: 0
VOMITING: 1
DIARRHEA: 1
DIARRHEA: 0

## 2025-02-05 ASSESSMENT — PATIENT HEALTH QUESTIONNAIRE - PHQ9
1. LITTLE INTEREST OR PLEASURE IN DOING THINGS: NOT AT ALL
SUM OF ALL RESPONSES TO PHQ QUESTIONS 1-9: 0
2. FEELING DOWN, DEPRESSED OR HOPELESS: NOT AT ALL
SUM OF ALL RESPONSES TO PHQ QUESTIONS 1-9: 0
SUM OF ALL RESPONSES TO PHQ9 QUESTIONS 1 & 2: 0
DEPRESSION UNABLE TO ASSESS: FUNCTIONAL CAPACITY MOTIVATION LIMITS ACCURACY

## 2025-02-05 ASSESSMENT — ANXIETY QUESTIONNAIRES
6. BECOMING EASILY ANNOYED OR IRRITABLE: NOT AT ALL
4. TROUBLE RELAXING: NOT AT ALL
IF YOU CHECKED OFF ANY PROBLEMS ON THIS QUESTIONNAIRE, HOW DIFFICULT HAVE THESE PROBLEMS MADE IT FOR YOU TO DO YOUR WORK, TAKE CARE OF THINGS AT HOME, OR GET ALONG WITH OTHER PEOPLE: NOT DIFFICULT AT ALL
5. BEING SO RESTLESS THAT IT IS HARD TO SIT STILL: NOT AT ALL
7. FEELING AFRAID AS IF SOMETHING AWFUL MIGHT HAPPEN: NOT AT ALL
GAD7 TOTAL SCORE: 0
3. WORRYING TOO MUCH ABOUT DIFFERENT THINGS: NOT AT ALL
1. FEELING NERVOUS, ANXIOUS, OR ON EDGE: NOT AT ALL
2. NOT BEING ABLE TO STOP OR CONTROL WORRYING: NOT AT ALL

## 2025-02-05 NOTE — PROGRESS NOTES
2025     Chief Complaint   Patient presents with    Nausea    Fatigue    Generalized Body Aches    head kristyn       Lady Au (:  1960) is a 64 y.o. female, here for evaluation of the following medical concerns:    HPI    Lady is here today with complaints of fatigue, nausea, head fog, dizziness.  Symptoms started on 2025 with a lot of retching but no vomiting.  She had a few episodes of diarrhea.  None today.  No further retching or vomiting.  Continues to complain of nausea, fatigue and intermittent dizziness.  No chest pain or fever.    Review of Systems   Constitutional:  Positive for fatigue. Negative for chills and fever.   Respiratory:  Negative for cough and shortness of breath.    Cardiovascular:  Negative for chest pain and leg swelling.   Gastrointestinal:  Positive for nausea. Negative for diarrhea and vomiting.   Neurological:  Positive for dizziness. Negative for headaches.   All other systems reviewed and are negative.      Prior to Visit Medications    Medication Sig Taking? Authorizing Provider   rosuvastatin (CRESTOR) 20 MG tablet Take 1 tablet by mouth daily Yes Beverly Jackman APRN - CNP   aspirin 81 MG EC tablet Take 1 tablet by mouth daily Yes Beverly Jackman APRN - CNP   famotidine (PEPCID) 40 MG tablet Take 1 tablet by mouth 2 times daily Yes Ahmet Regan, DO   UBRELVY 100 MG TABS TAKE ONE TABLET BY MOUTH AT ONSET OF HEADACHE. MAY REPEAT X1 2 HOURS LATER IF NEEDED. DO NOT EXCEED 200/24 HOUR PERIOD Yes Gino Herman MD   ondansetron (ZOFRAN) 8 MG tablet TAKE 1 TABLET BY MOUTH EVERY 6 HOURS AS NEEDED FOR NAUSEA OR VOMITING Yes Ahmet Regan DO   pantoprazole (PROTONIX) 40 MG tablet TAKE 1 TABLET BY MOUTH EVERY DAY Yes Ahmet Regan, DO   Aspirin-Acetaminophen-Caffeine (EXCEDRIN PO) Take by mouth Yes Gino Herman MD   HYDROcodone-acetaminophen (NORCO) 7.5-325 MG per tablet  Yes Gino Herman MD   PREMARIN 0.625 MG/GM

## 2025-02-05 NOTE — PROGRESS NOTES
2025     Lady Au (:  1960) is a 64 y.o. female, here for evaluation of the following medical concerns:    Fatigue  Associated symptoms include fatigue, nausea and vomiting. Pertinent negatives include no diaphoresis or fever.   Generalized Body Aches  Associated symptoms include fatigue, nausea and vomiting. Pertinent negatives include no diaphoresis or fever.     Woke up Saturday night with dry heaves  Which has stopped  Still with nausea  Diarrhea yesterday but none today  Feels bran fog  Tachycardia on going since she had COVID 2 years ago   Fever 100.7 two days ago  No fever yesterday or today  She was having abdominal pain after dry heaving this past weekend   Drinking fluids       Review of Systems   Constitutional:  Positive for fatigue. Negative for diaphoresis, fever and unexpected weight change.   Respiratory:  Negative for shortness of breath.    Gastrointestinal:  Positive for diarrhea, nausea and vomiting.   Genitourinary:  Negative for dysuria, menstrual problem and pelvic pain.       Prior to Visit Medications    Medication Sig Taking? Authorizing Provider   rosuvastatin (CRESTOR) 20 MG tablet Take 1 tablet by mouth daily Yes Beverly Jackman APRN - CNP   aspirin 81 MG EC tablet Take 1 tablet by mouth daily Yes eBverly Jackman APRN - CNP   famotidine (PEPCID) 40 MG tablet Take 1 tablet by mouth 2 times daily Yes Ahmet Regan, DO   UBRELVY 100 MG TABS TAKE ONE TABLET BY MOUTH AT ONSET OF HEADACHE. MAY REPEAT X1 2 HOURS LATER IF NEEDED. DO NOT EXCEED 200/24 HOUR PERIOD Yes Gino Herman MD   ondansetron (ZOFRAN) 8 MG tablet TAKE 1 TABLET BY MOUTH EVERY 6 HOURS AS NEEDED FOR NAUSEA OR VOMITING Yes Ahmet Regan DO   pantoprazole (PROTONIX) 40 MG tablet TAKE 1 TABLET BY MOUTH EVERY DAY Yes Ahmet Regan, DO   Aspirin-Acetaminophen-Caffeine (EXCEDRIN PO) Take by mouth Yes Gino Herman MD   HYDROcodone-acetaminophen (NORCO) 7.5-325 MG per

## 2025-02-05 NOTE — PATIENT INSTRUCTIONS
Urinalysis today looks normal with no signs of infection  Get your blood work done today  Hydrate really well with frequent small sips of water and Gatorade  Try meclizine 25 mg 3 times daily as needed for dizziness and nausea  Follow-up next week if symptoms are not improving

## 2025-02-06 LAB
ANION GAP SERPL CALCULATED.3IONS-SCNC: 13 MMOL/L (ref 3–16)
BUN SERPL-MCNC: 17 MG/DL (ref 7–20)
CALCIUM SERPL-MCNC: 9.6 MG/DL (ref 8.3–10.6)
CHLORIDE SERPL-SCNC: 102 MMOL/L (ref 99–110)
CO2 SERPL-SCNC: 28 MMOL/L (ref 21–32)
CREAT SERPL-MCNC: 0.9 MG/DL (ref 0.6–1.2)
DEPRECATED RDW RBC AUTO: 13.5 % (ref 12.4–15.4)
GFR SERPLBLD CREATININE-BSD FMLA CKD-EPI: 72 ML/MIN/{1.73_M2}
GLUCOSE SERPL-MCNC: 99 MG/DL (ref 70–99)
HCT VFR BLD AUTO: 44.2 % (ref 36–48)
HGB BLD-MCNC: 14.7 G/DL (ref 12–16)
MCH RBC QN AUTO: 31.8 PG (ref 26–34)
MCHC RBC AUTO-ENTMCNC: 33.3 G/DL (ref 31–36)
MCV RBC AUTO: 95.5 FL (ref 80–100)
PLATELET # BLD AUTO: 180 K/UL (ref 135–450)
PMV BLD AUTO: 9.1 FL (ref 5–10.5)
POTASSIUM SERPL-SCNC: 3.6 MMOL/L (ref 3.5–5.1)
RBC # BLD AUTO: 4.63 M/UL (ref 4–5.2)
SODIUM SERPL-SCNC: 143 MMOL/L (ref 136–145)
WBC # BLD AUTO: 6.4 K/UL (ref 4–11)

## 2025-02-20 RX ORDER — PANTOPRAZOLE SODIUM 40 MG/1
40 TABLET, DELAYED RELEASE ORAL DAILY
Qty: 90 TABLET | Refills: 1 | Status: SHIPPED | OUTPATIENT
Start: 2025-02-20

## 2025-02-20 NOTE — TELEPHONE ENCOUNTER
Future Appointments   Date Time Provider Department Center   6/5/2025  9:40 AM Ahmet Regan DO [facility] ECC DEP   6/9/2025 10:00 AM Ag Whitlock MD [facility] 12/4/2024

## 2025-02-26 ENCOUNTER — TELEPHONE (OUTPATIENT)
Dept: FAMILY MEDICINE CLINIC | Age: 65
End: 2025-02-26

## 2025-02-26 RX ORDER — MECLIZINE HYDROCHLORIDE 25 MG/1
25 TABLET ORAL 3 TIMES DAILY PRN
Qty: 30 TABLET | Refills: 0 | Status: SHIPPED | OUTPATIENT
Start: 2025-02-26

## 2025-02-26 RX ORDER — ONDANSETRON 8 MG/1
8 TABLET, FILM COATED ORAL EVERY 6 HOURS PRN
Qty: 90 TABLET | Refills: 0 | Status: SHIPPED | OUTPATIENT
Start: 2025-02-26

## 2025-02-26 NOTE — TELEPHONE ENCOUNTER
I refilled 30 tablets but if the dizziness continues I would like patient to schedule an appointment for additional evaluation.  Thank you

## 2025-02-26 NOTE — TELEPHONE ENCOUNTER
Patient is asking dr regan to start giving her this again because she is dizzy again      meclizine (ANTIVERT) 25 MG tablet    90 days    [facility] Appointments   Date Time Provider Department Center   6/5/2025  9:40 AM Ahmet Regan DO [place] DEP   6/9/2025 10:00 AM Ag Whitlock MD [facility]

## 2025-02-26 NOTE — TELEPHONE ENCOUNTER
Lov 12/4/2024    Future Appointments   Date Time Provider Department Center   6/5/2025  9:40 AM Ahmet Regan DO [place] ECC DEP   6/9/2025 10:00 AM Ag Whitlock MD [facility]

## 2025-02-26 NOTE — TELEPHONE ENCOUNTER
Spoke with patient and informed of message. She states she is dizzy every morning. Advised patient that she needs an appointment. Patient states she has a lot going on and will call back to schedule. Patient advised she will not be able to get further refills without an appointment.

## 2025-04-17 ENCOUNTER — TELEPHONE (OUTPATIENT)
Dept: FAMILY MEDICINE CLINIC | Age: 65
End: 2025-04-17

## 2025-04-17 DIAGNOSIS — M32.8 OTHER FORMS OF SYSTEMIC LUPUS ERYTHEMATOSUS, UNSPECIFIED ORGAN INVOLVEMENT STATUS (HCC): Primary | ICD-10-CM

## 2025-04-17 NOTE — TELEPHONE ENCOUNTER
Patient needs a referral for a rheumatologist for her lupus. Please let her know once its been placed

## 2025-05-19 RX ORDER — PANTOPRAZOLE SODIUM 40 MG/1
40 TABLET, DELAYED RELEASE ORAL DAILY
Qty: 90 TABLET | Refills: 1 | Status: SHIPPED | OUTPATIENT
Start: 2025-05-19

## 2025-05-19 NOTE — TELEPHONE ENCOUNTER
LOV 12/4/2024  Future Appointments   Date Time Provider Department Center   6/5/2025  1:00 PM Ahmet Regan DO [facility] DEP   6/9/2025 10:00 AM Ag Whitlock MD [facility]   8/8/2025  9:00 AM [facility] RM 1 [facility]

## 2025-05-22 ENCOUNTER — TRANSCRIBE ORDERS (OUTPATIENT)
Dept: ADMINISTRATIVE | Age: 65
End: 2025-05-22

## 2025-05-22 DIAGNOSIS — R31.0 GROSS HEMATURIA: Primary | ICD-10-CM

## 2025-05-28 ENCOUNTER — HOSPITAL ENCOUNTER (EMERGENCY)
Age: 65
Discharge: HOME | End: 2025-05-28
Attending: EMERGENCY MEDICINE
Payer: MEDICARE

## 2025-05-28 ENCOUNTER — HOSPITAL ENCOUNTER (OUTPATIENT)
Dept: ULTRASOUND IMAGING | Age: 65
Discharge: HOME | End: 2025-05-28
Payer: MEDICARE

## 2025-05-28 VITALS
WEIGHT: 133.8 LBS | RESPIRATION RATE: 15 BRPM | HEART RATE: 72 BPM | OXYGEN SATURATION: 95 % | DIASTOLIC BLOOD PRESSURE: 78 MMHG | TEMPERATURE: 97.8 F | SYSTOLIC BLOOD PRESSURE: 146 MMHG | BODY MASS INDEX: 24.46 KG/M2

## 2025-05-28 DIAGNOSIS — R00.2 PALPITATIONS: Primary | ICD-10-CM

## 2025-05-28 DIAGNOSIS — R31.0 GROSS HEMATURIA: ICD-10-CM

## 2025-05-28 LAB
ALBUMIN SERPL-MCNC: 4.6 G/DL (ref 3.4–5)
ALBUMIN/GLOB SERPL: 1.7 {RATIO} (ref 1.1–2.2)
ALP SERPL-CCNC: 108 U/L (ref 40–129)
ALT SERPL-CCNC: 19 U/L (ref 10–40)
ANION GAP SERPL CALCULATED.3IONS-SCNC: 13 MMOL/L (ref 3–16)
AST SERPL-CCNC: 27 U/L (ref 15–37)
BASOPHILS # BLD: 0.1 K/UL (ref 0–0.2)
BASOPHILS NFR BLD: 1 %
BILIRUB SERPL-MCNC: <0.2 MG/DL (ref 0–1)
BUN SERPL-MCNC: 14 MG/DL (ref 7–20)
CALCIUM SERPL-MCNC: 9.5 MG/DL (ref 8.3–10.6)
CHLORIDE SERPL-SCNC: 106 MMOL/L (ref 99–110)
CO2 SERPL-SCNC: 23 MMOL/L (ref 21–32)
CREAT SERPL-MCNC: 0.9 MG/DL (ref 0.6–1.2)
DEPRECATED RDW RBC AUTO: 12.7 % (ref 12.4–15.4)
EKG ATRIAL RATE: 74 BPM
EKG DIAGNOSIS: NORMAL
EKG P AXIS: 63 DEGREES
EKG P-R INTERVAL: 96 MS
EKG Q-T INTERVAL: 382 MS
EKG QRS DURATION: 78 MS
EKG QTC CALCULATION (BAZETT): 424 MS
EKG R AXIS: 24 DEGREES
EKG T AXIS: 32 DEGREES
EKG VENTRICULAR RATE: 74 BPM
EOSINOPHIL # BLD: 0 K/UL (ref 0–0.6)
EOSINOPHIL NFR BLD: 0.2 %
GFR SERPLBLD CREATININE-BSD FMLA CKD-EPI: 71 ML/MIN/{1.73_M2}
GLUCOSE SERPL-MCNC: 87 MG/DL (ref 70–99)
HCT VFR BLD AUTO: 40.5 % (ref 36–48)
HGB BLD-MCNC: 14.1 G/DL (ref 12–16)
LYMPHOCYTES # BLD: 2.5 K/UL (ref 1–5.1)
LYMPHOCYTES NFR BLD: 38.7 %
MCH RBC QN AUTO: 32.8 PG (ref 26–34)
MCHC RBC AUTO-ENTMCNC: 34.9 G/DL (ref 31–36)
MCV RBC AUTO: 94.2 FL (ref 80–100)
MONOCYTES # BLD: 0.5 K/UL (ref 0–1.3)
MONOCYTES NFR BLD: 7.3 %
NEUTROPHILS # BLD: 3.5 K/UL (ref 1.7–7.7)
NEUTROPHILS NFR BLD: 52.8 %
PLATELET # BLD AUTO: 193 K/UL (ref 135–450)
PMV BLD AUTO: 7.8 FL (ref 5–10.5)
POTASSIUM SERPL-SCNC: 3.7 MMOL/L (ref 3.5–5.1)
PROT SERPL-MCNC: 7.3 G/DL (ref 6.4–8.2)
RBC # BLD AUTO: 4.3 M/UL (ref 4–5.2)
SODIUM SERPL-SCNC: 142 MMOL/L (ref 136–145)
WBC # BLD AUTO: 6.6 K/UL (ref 4–11)

## 2025-05-28 PROCEDURE — 93010 ELECTROCARDIOGRAM REPORT: CPT | Performed by: INTERNAL MEDICINE

## 2025-05-28 PROCEDURE — 85025 COMPLETE CBC W/AUTO DIFF WBC: CPT

## 2025-05-28 PROCEDURE — 99284 EMERGENCY DEPT VISIT MOD MDM: CPT

## 2025-05-28 PROCEDURE — 76770 US EXAM ABDO BACK WALL COMP: CPT

## 2025-05-28 PROCEDURE — 93005 ELECTROCARDIOGRAM TRACING: CPT | Performed by: EMERGENCY MEDICINE

## 2025-05-28 PROCEDURE — 80053 COMPREHEN METABOLIC PANEL: CPT

## 2025-05-28 ASSESSMENT — PAIN DESCRIPTION - PAIN TYPE
TYPE: ACUTE PAIN
TYPE: ACUTE PAIN

## 2025-05-28 ASSESSMENT — PAIN SCALES - GENERAL
PAINLEVEL_OUTOF10: 0
PAINLEVEL_OUTOF10: 8

## 2025-05-28 ASSESSMENT — PAIN DESCRIPTION - DESCRIPTORS: DESCRIPTORS: DISCOMFORT

## 2025-05-28 ASSESSMENT — PAIN - FUNCTIONAL ASSESSMENT
PAIN_FUNCTIONAL_ASSESSMENT: 0-10
PAIN_FUNCTIONAL_ASSESSMENT: 0-10

## 2025-05-28 NOTE — DISCHARGE INSTRUCTIONS
Rest.  Follow-up with your cardiologist.  Return to the ER with any weakness, difficulty breathing, or new concerns

## 2025-05-28 NOTE — ED PROVIDER NOTES
Emergency Department Provider Note  Location: [facility] EMERGENCY DEPARTMENT  2025     Patient Identification  Lady Au is a 64 y.o. female    Chief Complaint  Abnormal Test Results (Patient reporting she went to Urgent Care today because she's been feeling ill lately. Says for the past month her heart has been \"racey\" Sees cardiology on 25. Says she feels like she's \"spaced out\" lately and says her stomach hurts. Patient was told by Urgent Care to see her cardiologist anuel to an abnormal EKG. States cardiology told her to come to the ED )          HPI  (History provided by patient)  Patient presents from urgent care with an abnormal EKG.  She has a history of palpitations and sees Dr. Narvaez cardiology.  She endorses intermittent palpitations for the past month.  She also has had some numbness and tingling around her mouth/lips.  Last week she had a knot in her throat sensation.  She states that sometimes she feels spaced out.  Today she denies dizziness, palpitations, chest pain, shortness of breath, numbness, weakness.  She does not take any medications for her heart.    Nursing Notes reviewed.    Allergies:   Allergies   Allergen Reactions    Methadone     Carisoprodol Nausea And Vomiting       Past medical history:  has a past medical history of Anxiety (), Chronic back pain (), COVID-19, Headache (), Hyperlipidemia, Lupus (), and Osteoarthritis (?).    Past surgical history:  has a past surgical history that includes Tonsillectomy; Appendectomy (); Tubal ligation (); joint replacement (23); and  section.    Home medications:   Prior to Admission medications    Medication Sig Start Date End Date Taking? Authorizing Provider   pantoprazole (PROTONIX) 40 MG tablet TAKE 1 TABLET BY MOUTH EVERY DAY 25   Ahmet Regan,    ondansetron (ZOFRAN) 8 MG tablet TAKE 1 TABLET BY MOUTH EVERY 6 HOURS AS NEEDED FOR NAUSEA OR VOMITING 25

## 2025-05-29 RX ORDER — ONDANSETRON 8 MG/1
8 TABLET, FILM COATED ORAL EVERY 6 HOURS PRN
Qty: 90 TABLET | Refills: 0 | Status: SHIPPED | OUTPATIENT
Start: 2025-05-29

## 2025-05-29 NOTE — TELEPHONE ENCOUNTER
Lov 12/4/2024    Future Appointments   Date Time Provider Department Center   6/5/2025  1:00 PM Ahmet Regan DO [facility] DEP   6/9/2025 10:00 AM Ag Whitlock MD [facility]   8/8/2025  9:00 AM [facility] RM 1 [facility]

## 2025-06-02 SDOH — HEALTH STABILITY: PHYSICAL HEALTH: ON AVERAGE, HOW MANY DAYS PER WEEK DO YOU ENGAGE IN MODERATE TO STRENUOUS EXERCISE (LIKE A BRISK WALK)?: 4 DAYS

## 2025-06-02 SDOH — HEALTH STABILITY: PHYSICAL HEALTH: ON AVERAGE, HOW MANY MINUTES DO YOU ENGAGE IN EXERCISE AT THIS LEVEL?: 60 MIN

## 2025-06-02 ASSESSMENT — PATIENT HEALTH QUESTIONNAIRE - PHQ9
8. MOVING OR SPEAKING SO SLOWLY THAT OTHER PEOPLE COULD HAVE NOTICED. OR THE OPPOSITE, BEING SO FIGETY OR RESTLESS THAT YOU HAVE BEEN MOVING AROUND A LOT MORE THAN USUAL: SEVERAL DAYS
SUM OF ALL RESPONSES TO PHQ QUESTIONS 1-9: 6
SUM OF ALL RESPONSES TO PHQ QUESTIONS 1-9: 6
6. FEELING BAD ABOUT YOURSELF - OR THAT YOU ARE A FAILURE OR HAVE LET YOURSELF OR YOUR FAMILY DOWN: NOT AT ALL
4. FEELING TIRED OR HAVING LITTLE ENERGY: SEVERAL DAYS
2. FEELING DOWN, DEPRESSED OR HOPELESS: SEVERAL DAYS
10. IF YOU CHECKED OFF ANY PROBLEMS, HOW DIFFICULT HAVE THESE PROBLEMS MADE IT FOR YOU TO DO YOUR WORK, TAKE CARE OF THINGS AT HOME, OR GET ALONG WITH OTHER PEOPLE: SOMEWHAT DIFFICULT
SUM OF ALL RESPONSES TO PHQ QUESTIONS 1-9: 6
9. THOUGHTS THAT YOU WOULD BE BETTER OFF DEAD, OR OF HURTING YOURSELF: NOT AT ALL
1. LITTLE INTEREST OR PLEASURE IN DOING THINGS: MORE THAN HALF THE DAYS
3. TROUBLE FALLING OR STAYING ASLEEP: SEVERAL DAYS
7. TROUBLE CONCENTRATING ON THINGS, SUCH AS READING THE NEWSPAPER OR WATCHING TELEVISION: NOT AT ALL
5. POOR APPETITE OR OVEREATING: NOT AT ALL
SUM OF ALL RESPONSES TO PHQ QUESTIONS 1-9: 6

## 2025-06-02 ASSESSMENT — LIFESTYLE VARIABLES
HOW MANY STANDARD DRINKS CONTAINING ALCOHOL DO YOU HAVE ON A TYPICAL DAY: PATIENT DOES NOT DRINK
HOW OFTEN DO YOU HAVE A DRINK CONTAINING ALCOHOL: NEVER
HOW OFTEN DO YOU HAVE A DRINK CONTAINING ALCOHOL: 1
HOW OFTEN DO YOU HAVE SIX OR MORE DRINKS ON ONE OCCASION: 1
HOW MANY STANDARD DRINKS CONTAINING ALCOHOL DO YOU HAVE ON A TYPICAL DAY: 0

## 2025-06-05 ENCOUNTER — OFFICE VISIT (OUTPATIENT)
Dept: FAMILY MEDICINE CLINIC | Age: 65
End: 2025-06-05
Payer: MEDICARE

## 2025-06-05 VITALS
RESPIRATION RATE: 16 BRPM | WEIGHT: 134 LBS | OXYGEN SATURATION: 96 % | DIASTOLIC BLOOD PRESSURE: 74 MMHG | BODY MASS INDEX: 24.66 KG/M2 | TEMPERATURE: 97.7 F | HEIGHT: 62 IN | HEART RATE: 75 BPM | SYSTOLIC BLOOD PRESSURE: 110 MMHG

## 2025-06-05 DIAGNOSIS — F41.9 ANXIETY: ICD-10-CM

## 2025-06-05 DIAGNOSIS — Z00.00 MEDICARE ANNUAL WELLNESS VISIT, SUBSEQUENT: Primary | ICD-10-CM

## 2025-06-05 DIAGNOSIS — M32.9 SYSTEMIC LUPUS ERYTHEMATOSUS, UNSPECIFIED SLE TYPE, UNSPECIFIED ORGAN INVOLVEMENT STATUS (HCC): ICD-10-CM

## 2025-06-05 DIAGNOSIS — R73.01 ELEVATED FASTING GLUCOSE: ICD-10-CM

## 2025-06-05 DIAGNOSIS — E78.00 ELEVATED LDL CHOLESTEROL LEVEL: ICD-10-CM

## 2025-06-05 DIAGNOSIS — K21.9 GASTROESOPHAGEAL REFLUX DISEASE, UNSPECIFIED WHETHER ESOPHAGITIS PRESENT: ICD-10-CM

## 2025-06-05 DIAGNOSIS — R45.89 DEPRESSED MOOD: ICD-10-CM

## 2025-06-05 PROCEDURE — 99214 OFFICE O/P EST MOD 30 MIN: CPT | Performed by: FAMILY MEDICINE

## 2025-06-05 PROCEDURE — G0439 PPPS, SUBSEQ VISIT: HCPCS | Performed by: FAMILY MEDICINE

## 2025-06-05 PROCEDURE — G2211 COMPLEX E/M VISIT ADD ON: HCPCS | Performed by: FAMILY MEDICINE

## 2025-06-05 RX ORDER — VIBEGRON 75 MG/1
75 TABLET, FILM COATED ORAL DAILY
COMMUNITY
Start: 2025-04-07

## 2025-06-05 RX ORDER — ACETAMINOPHEN 500 MG
1 TABLET ORAL
COMMUNITY

## 2025-06-05 RX ORDER — MULTIVITAMIN WITH IRON
500 TABLET ORAL
COMMUNITY

## 2025-06-05 RX ORDER — MECLIZINE HYDROCHLORIDE 25 MG/1
25 TABLET ORAL 3 TIMES DAILY PRN
Qty: 90 TABLET | Refills: 0 | OUTPATIENT
Start: 2025-06-05

## 2025-06-05 NOTE — PROGRESS NOTES
Medicare Annual Wellness Visit    Lady Au is here for Medicare AWV (awv)    Assessment & Plan   Medicare annual wellness visit, subsequent  Gastroesophageal reflux disease, unspecified whether esophagitis present  -     CBC with Auto Differential; Future  Systemic lupus erythematosus, unspecified SLE type, unspecified organ involvement status (HCC)  Anxiety  -     TSH reflex to FT4; Future  -     Non [facility] - External Referral To Social Work  -     Non [facility] - External Referral to Psychiatry  -     sertraline (ZOLOFT) 50 MG tablet; Take 1 tablet by mouth daily, Disp-30 tablet, R-0Normal  Elevated LDL cholesterol level  -     Lipid Panel; Future  Elevated fasting glucose  -     Comprehensive Metabolic Panel; Future  Depressed mood  -     Non [facility] - External Referral To Social Work  -     Non [facility] - External Referral to Psychiatry  -     sertraline (ZOLOFT) 50 MG tablet; Take 1 tablet by mouth daily, Disp-30 tablet, R-0Normal     Return in about 3 weeks (around 6/26/2025) for Depression F/U.     Subjective   The following acute and/or chronic problems were also addressed today:  GERD:  - taking Famotidine 40 mg at night and Pantoprazole 40 mg daily  - states that they are working well.    Anxiety/Depression:  - Admits to several health issues (bladder, neck/back pain, eye issues)  - Referral to Liliana HUNT    Hematuria: Admits to chronic hematuria, taking Gemtesa 75 mg daily which helps some, sees urology  Palpitations: Has appt with Cardiology next week for palpitations    Neck pain: Chronic, saw Ortho, had MRI, was prescribed PT which included traction, states she went 4-5 x, also did PT exercises at home but saw no improvement, sees Pain Management for LBP, states she has had several injection in her neck and sine and that they didn't work.    Encouraged pt to f/u with Maude WATKINS or that I can place a new referral to [facility] for a second opinion but pt prefers to find out the source of

## 2025-06-05 NOTE — PROGRESS NOTES
updated at this visit.   Patient verbalizes understanding of the need for treatment and education has been provided at today's visit. Additional education material will be provided in after visit summary.    Check blood pressure and heart rate at home a few times per week- keep a log with dates and times and bring to office visit   Regular exercise and following a healthy diet encouraged   Follow up with me in 6 months     Scribe's attestation:  This note was scribed in the presence of Dr. Ag Whitlock M.D. By Ninoska Luke RN    The scribe’s documentation has been prepared under my direction and personally reviewed by me in its entirety.  I confirm that the note above accurately reflects all work, treatment, procedures, and medical decision making performed by me.    Dr. Ag Whitlock MD    Thank you for allowing me to participate in the care of this individual.    Chepe Whitlock M.D., [facility], [facility]

## 2025-06-05 NOTE — TELEPHONE ENCOUNTER
Patient was seen today Asking for refills on Meclizine. She is taking it because she has neck pain that causes her to feel dizzy.

## 2025-06-05 NOTE — PATIENT INSTRUCTIONS
Personalized Preventive Plan for Lady Au - 6/5/2025  Medicare offers a range of preventive health benefits. Some of the tests and screenings are paid in full while other may be subject to a deductible, co-insurance, and/or copay.  Some of these benefits include a comprehensive review of your medical history including lifestyle, illnesses that may run in your family, and various assessments and screenings as appropriate.  After reviewing your medical record and screening and assessments performed today your provider may have ordered immunizations, labs, imaging, and/or referrals for you.  A list of these orders (if applicable) as well as your Preventive Care list are included within your After Visit Summary for your review.

## 2025-06-09 ENCOUNTER — OFFICE VISIT (OUTPATIENT)
Dept: CARDIOLOGY CLINIC | Age: 65
End: 2025-06-09
Payer: MEDICARE

## 2025-06-09 VITALS
WEIGHT: 134 LBS | HEIGHT: 60 IN | OXYGEN SATURATION: 97 % | BODY MASS INDEX: 26.31 KG/M2 | DIASTOLIC BLOOD PRESSURE: 74 MMHG | SYSTOLIC BLOOD PRESSURE: 130 MMHG | HEART RATE: 78 BPM

## 2025-06-09 DIAGNOSIS — I65.23 BILATERAL CAROTID ARTERY STENOSIS: ICD-10-CM

## 2025-06-09 DIAGNOSIS — R00.2 PALPITATIONS: ICD-10-CM

## 2025-06-09 DIAGNOSIS — R94.31 ABNORMAL EKG: Primary | ICD-10-CM

## 2025-06-09 PROCEDURE — 99214 OFFICE O/P EST MOD 30 MIN: CPT | Performed by: INTERNAL MEDICINE

## 2025-06-09 RX ORDER — METOPROLOL SUCCINATE 25 MG/1
25 TABLET, EXTENDED RELEASE ORAL DAILY
Qty: 30 TABLET | Refills: 5 | Status: SHIPPED | OUTPATIENT
Start: 2025-06-09

## 2025-06-09 NOTE — PATIENT INSTRUCTIONS
~We discussed palpitations can be worse when you are under stress or are sick     ~Start Metoprolol Succinate, also called Toprol XL 25 mg daily to help with palpitations   ~Repeat carotid dopplers in August    ~Call [facility] scheduling at [phone] to schedule testing    ~Repeat fasting lipids and magnesium level     Cardiac medications reviewed including indications and pertinent side effects. Medication list updated at this visit.   Patient verbalizes understanding of the need for treatment and education has been provided at today's visit. Additional education material will be provided in after visit summary.    Check blood pressure and heart rate at home a few times per week- keep a log with dates and times and bring to office visit   Regular exercise and following a healthy diet encouraged   Follow up with me in 6 months
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
## 2025-06-15 ASSESSMENT — ANXIETY QUESTIONNAIRES
5. BEING SO RESTLESS THAT IT IS HARD TO SIT STILL: SEVERAL DAYS
3. WORRYING TOO MUCH ABOUT DIFFERENT THINGS: SEVERAL DAYS
7. FEELING AFRAID AS IF SOMETHING AWFUL MIGHT HAPPEN: NOT AT ALL
7. FEELING AFRAID AS IF SOMETHING AWFUL MIGHT HAPPEN: NOT AT ALL
6. BECOMING EASILY ANNOYED OR IRRITABLE: SEVERAL DAYS
1. FEELING NERVOUS, ANXIOUS, OR ON EDGE: NEARLY EVERY DAY
6. BECOMING EASILY ANNOYED OR IRRITABLE: SEVERAL DAYS
IF YOU CHECKED OFF ANY PROBLEMS ON THIS QUESTIONNAIRE, HOW DIFFICULT HAVE THESE PROBLEMS MADE IT FOR YOU TO DO YOUR WORK, TAKE CARE OF THINGS AT HOME, OR GET ALONG WITH OTHER PEOPLE: SOMEWHAT DIFFICULT
IF YOU CHECKED OFF ANY PROBLEMS ON THIS QUESTIONNAIRE, HOW DIFFICULT HAVE THESE PROBLEMS MADE IT FOR YOU TO DO YOUR WORK, TAKE CARE OF THINGS AT HOME, OR GET ALONG WITH OTHER PEOPLE: SOMEWHAT DIFFICULT
5. BEING SO RESTLESS THAT IT IS HARD TO SIT STILL: SEVERAL DAYS
3. WORRYING TOO MUCH ABOUT DIFFERENT THINGS: SEVERAL DAYS
4. TROUBLE RELAXING: SEVERAL DAYS
1. FEELING NERVOUS, ANXIOUS, OR ON EDGE: NEARLY EVERY DAY
4. TROUBLE RELAXING: SEVERAL DAYS
GAD7 TOTAL SCORE: 8
2. NOT BEING ABLE TO STOP OR CONTROL WORRYING: SEVERAL DAYS
2. NOT BEING ABLE TO STOP OR CONTROL WORRYING: SEVERAL DAYS

## 2025-06-15 ASSESSMENT — PATIENT HEALTH QUESTIONNAIRE - PHQ9
9. THOUGHTS THAT YOU WOULD BE BETTER OFF DEAD, OR OF HURTING YOURSELF: NOT AT ALL
SUM OF ALL RESPONSES TO PHQ QUESTIONS 1-9: 6
8. MOVING OR SPEAKING SO SLOWLY THAT OTHER PEOPLE COULD HAVE NOTICED. OR THE OPPOSITE - BEING SO FIDGETY OR RESTLESS THAT YOU HAVE BEEN MOVING AROUND A LOT MORE THAN USUAL: NOT AT ALL
10. IF YOU CHECKED OFF ANY PROBLEMS, HOW DIFFICULT HAVE THESE PROBLEMS MADE IT FOR YOU TO DO YOUR WORK, TAKE CARE OF THINGS AT HOME, OR GET ALONG WITH OTHER PEOPLE: SOMEWHAT DIFFICULT
SUM OF ALL RESPONSES TO PHQ QUESTIONS 1-9: 6
3. TROUBLE FALLING OR STAYING ASLEEP: SEVERAL DAYS
8. MOVING OR SPEAKING SO SLOWLY THAT OTHER PEOPLE COULD HAVE NOTICED. OR THE OPPOSITE, BEING SO FIGETY OR RESTLESS THAT YOU HAVE BEEN MOVING AROUND A LOT MORE THAN USUAL: NOT AT ALL
7. TROUBLE CONCENTRATING ON THINGS, SUCH AS READING THE NEWSPAPER OR WATCHING TELEVISION: SEVERAL DAYS
1. LITTLE INTEREST OR PLEASURE IN DOING THINGS: SEVERAL DAYS
6. FEELING BAD ABOUT YOURSELF - OR THAT YOU ARE A FAILURE OR HAVE LET YOURSELF OR YOUR FAMILY DOWN: NOT AT ALL
2. FEELING DOWN, DEPRESSED OR HOPELESS: SEVERAL DAYS
2. FEELING DOWN, DEPRESSED OR HOPELESS: SEVERAL DAYS
3. TROUBLE FALLING OR STAYING ASLEEP: SEVERAL DAYS
6. FEELING BAD ABOUT YOURSELF - OR THAT YOU ARE A FAILURE OR HAVE LET YOURSELF OR YOUR FAMILY DOWN: NOT AT ALL
SUM OF ALL RESPONSES TO PHQ QUESTIONS 1-9: 6
SUM OF ALL RESPONSES TO PHQ QUESTIONS 1-9: 6
9. THOUGHTS THAT YOU WOULD BE BETTER OFF DEAD, OR OF HURTING YOURSELF: NOT AT ALL
5. POOR APPETITE OR OVEREATING: SEVERAL DAYS
1. LITTLE INTEREST OR PLEASURE IN DOING THINGS: SEVERAL DAYS
10. IF YOU CHECKED OFF ANY PROBLEMS, HOW DIFFICULT HAVE THESE PROBLEMS MADE IT FOR YOU TO DO YOUR WORK, TAKE CARE OF THINGS AT HOME, OR GET ALONG WITH OTHER PEOPLE: SOMEWHAT DIFFICULT
4. FEELING TIRED OR HAVING LITTLE ENERGY: SEVERAL DAYS
7. TROUBLE CONCENTRATING ON THINGS, SUCH AS READING THE NEWSPAPER OR WATCHING TELEVISION: SEVERAL DAYS
5. POOR APPETITE OR OVEREATING: SEVERAL DAYS
4. FEELING TIRED OR HAVING LITTLE ENERGY: SEVERAL DAYS
SUM OF ALL RESPONSES TO PHQ QUESTIONS 1-9: 6

## 2025-06-15 ASSESSMENT — LIFESTYLE VARIABLES
HAVE YOU EVER RECEIVED ALCOHOL OR OTHER DRUG ABUSE TREATMENT: NO
HISTORY_ALCOHOL_USE: NO
ALCOHOL_DAYS_PER_WEEK: 0
PAST THREE MONTHS WHAT IS THE LARGEST AMOUNT OF ALCOHOLIC DRINKS YOU HAVE CONSUMED IN ONE DAY: 0

## 2025-06-16 ENCOUNTER — TELEMEDICINE (OUTPATIENT)
Dept: PSYCHOLOGY | Age: 65
End: 2025-06-16
Payer: MEDICARE

## 2025-06-16 DIAGNOSIS — F41.9 ANXIETY: Primary | ICD-10-CM

## 2025-06-16 PROCEDURE — 90791 PSYCH DIAGNOSTIC EVALUATION: CPT | Performed by: SOCIAL WORKER

## 2025-06-16 NOTE — PATIENT INSTRUCTIONS
[facility]: [phone]  [facility]: [phone]  [facility]: [phone]

## 2025-06-23 ENCOUNTER — HOSPITAL ENCOUNTER (EMERGENCY)
Age: 65
Discharge: HOME | End: 2025-06-23
Payer: MEDICARE

## 2025-06-23 ENCOUNTER — APPOINTMENT (OUTPATIENT)
Dept: CT IMAGING | Age: 65
End: 2025-06-23
Payer: MEDICARE

## 2025-06-23 VITALS
WEIGHT: 131 LBS | DIASTOLIC BLOOD PRESSURE: 88 MMHG | SYSTOLIC BLOOD PRESSURE: 149 MMHG | TEMPERATURE: 98.3 F | OXYGEN SATURATION: 99 % | BODY MASS INDEX: 25.89 KG/M2 | HEART RATE: 63 BPM | RESPIRATION RATE: 16 BRPM

## 2025-06-23 DIAGNOSIS — R31.9 HEMATURIA, UNSPECIFIED TYPE: Primary | ICD-10-CM

## 2025-06-23 LAB
BACTERIA URNS QL MICRO: ABNORMAL /HPF
BILIRUB UR QL STRIP.AUTO: ABNORMAL
CLARITY UR: ABNORMAL
COLOR UR: ABNORMAL
EPI CELLS #/AREA URNS HPF: ABNORMAL /HPF (ref 0–5)
GLUCOSE UR STRIP.AUTO-MCNC: ABNORMAL MG/DL
HGB UR QL STRIP.AUTO: ABNORMAL
KETONES UR STRIP.AUTO-MCNC: ABNORMAL MG/DL
LEUKOCYTE ESTERASE UR QL STRIP.AUTO: ABNORMAL
NITRITE UR QL STRIP.AUTO: ABNORMAL
PH UR STRIP.AUTO: ABNORMAL [PH] (ref 5–8)
PROT UR STRIP.AUTO-MCNC: ABNORMAL MG/DL
RBC #/AREA URNS HPF: >100 /HPF (ref 0–4)
SP GR UR STRIP.AUTO: ABNORMAL (ref 1–1.03)
UA COMPLETE W REFLEX CULTURE PNL UR: ABNORMAL
UA DIPSTICK W REFLEX MICRO PNL UR: YES
URN SPEC COLLECT METH UR: ABNORMAL
UROBILINOGEN UR STRIP-ACNC: ABNORMAL E.U./DL
WBC #/AREA URNS HPF: ABNORMAL /HPF (ref 0–5)

## 2025-06-23 PROCEDURE — 99284 EMERGENCY DEPT VISIT MOD MDM: CPT

## 2025-06-23 PROCEDURE — 74176 CT ABD & PELVIS W/O CONTRAST: CPT

## 2025-06-23 PROCEDURE — 81001 URINALYSIS AUTO W/SCOPE: CPT

## 2025-06-23 ASSESSMENT — PAIN - FUNCTIONAL ASSESSMENT: PAIN_FUNCTIONAL_ASSESSMENT: NONE - DENIES PAIN
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
## 2025-06-25 NOTE — PROGRESS NOTES
carotid bilateral      5. Anxiety  Not at goal        Instructed pt to call Cardiology and let them know of her recent episode. Also instructed pt that if sx reoccur to go to ED for immediate evaluation.    Discussed starting another medication for anxiety/depression but pt prefers to wait.    Return in about 1 month (around 7/26/2025).

## 2025-06-26 ENCOUNTER — OFFICE VISIT (OUTPATIENT)
Dept: FAMILY MEDICINE CLINIC | Age: 65
End: 2025-06-26
Payer: MEDICARE

## 2025-06-26 VITALS
DIASTOLIC BLOOD PRESSURE: 66 MMHG | RESPIRATION RATE: 16 BRPM | BODY MASS INDEX: 25.26 KG/M2 | OXYGEN SATURATION: 97 % | SYSTOLIC BLOOD PRESSURE: 92 MMHG | HEART RATE: 70 BPM | WEIGHT: 127.8 LBS | TEMPERATURE: 97.3 F

## 2025-06-26 DIAGNOSIS — R42 LIGHTHEADEDNESS: ICD-10-CM

## 2025-06-26 DIAGNOSIS — H53.9 VISION DISTURBANCE: ICD-10-CM

## 2025-06-26 DIAGNOSIS — R45.89 DEPRESSED MOOD: Primary | ICD-10-CM

## 2025-06-26 DIAGNOSIS — F41.9 ANXIETY: ICD-10-CM

## 2025-06-26 DIAGNOSIS — R42 DIZZINESS: ICD-10-CM

## 2025-06-26 PROCEDURE — 99214 OFFICE O/P EST MOD 30 MIN: CPT | Performed by: FAMILY MEDICINE

## 2025-06-26 ASSESSMENT — ENCOUNTER SYMPTOMS: ABDOMINAL PAIN: 1

## 2025-06-30 ENCOUNTER — TELEPHONE (OUTPATIENT)
Dept: FAMILY MEDICINE CLINIC | Age: 65
End: 2025-06-30

## 2025-06-30 NOTE — TELEPHONE ENCOUNTER
Patient seen 6/26/2025  Hard to get details from patient.  Has follow up questions from her visit    Is there a liver and kidney doctor she should be seeing?  The urologist keep on leaning towards thing effecting her kidney.     She has not been able to get out of bed.  She said she is frustrating and not sure where to turn    She is asking to talk with Dr. Regan     She went to [facility] on Sunday    She sent a my chart message over the weekend with details as well

## 2025-06-30 NOTE — TELEPHONE ENCOUNTER
Spoke with patient. Scheduled for VV. Given 40 minutes   Future Appointments   Date Time Provider Department Center   7/2/2025 11:00 AM [facility]   7/2/2025  4:20 PM Ahmet Regan DO [facility] DEP   7/29/2025  9:00 AM Ahmet Regan DO [facility] DEP   8/8/2025  9:00 AM [facility] RM 1 [place] [facility]   12/1/2025  9:00 AM Ag Whitlock MD [facility]

## 2025-07-02 ENCOUNTER — OFFICE VISIT (OUTPATIENT)
Dept: FAMILY MEDICINE CLINIC | Age: 65
End: 2025-07-02
Payer: MEDICARE

## 2025-07-02 ENCOUNTER — HOSPITAL ENCOUNTER (OUTPATIENT)
Dept: VASCULAR LAB | Age: 65
Discharge: HOME | End: 2025-07-04
Attending: FAMILY MEDICINE
Payer: MEDICARE

## 2025-07-02 VITALS
RESPIRATION RATE: 16 BRPM | HEART RATE: 69 BPM | OXYGEN SATURATION: 95 % | WEIGHT: 129 LBS | TEMPERATURE: 98.2 F | BODY MASS INDEX: 25.49 KG/M2 | DIASTOLIC BLOOD PRESSURE: 76 MMHG | SYSTOLIC BLOOD PRESSURE: 110 MMHG

## 2025-07-02 DIAGNOSIS — I77.1 ILIAC ARTERY STENOSIS, LEFT: ICD-10-CM

## 2025-07-02 DIAGNOSIS — Z12.12 SCREENING FOR COLORECTAL CANCER: ICD-10-CM

## 2025-07-02 DIAGNOSIS — H53.9 VISION DISTURBANCE: ICD-10-CM

## 2025-07-02 DIAGNOSIS — R45.89 DEPRESSED MOOD: ICD-10-CM

## 2025-07-02 DIAGNOSIS — R42 LIGHTHEADEDNESS: ICD-10-CM

## 2025-07-02 DIAGNOSIS — Z12.11 SCREENING FOR COLORECTAL CANCER: ICD-10-CM

## 2025-07-02 DIAGNOSIS — R42 DIZZINESS: ICD-10-CM

## 2025-07-02 DIAGNOSIS — N94.89 PELVIC CONGESTIVE SYNDROME: Primary | ICD-10-CM

## 2025-07-02 LAB
VAS LEFT CCA DIST EDV: 19.9 CM/S
VAS LEFT CCA DIST PSV: 63.6 CM/S
VAS LEFT CCA MID EDV: 13 CM/S
VAS LEFT CCA MID PSV: 58.2 CM/S
VAS LEFT CCA PROX EDV: 20.7 CM/S
VAS LEFT CCA PROX PSV: 82 CM/S
VAS LEFT ECA EDV: 9.96 CM/S
VAS LEFT ECA PSV: 69 CM/S
VAS LEFT ICA DIST EDV: 31.2 CM/S
VAS LEFT ICA DIST PSV: 97.8 CM/S
VAS LEFT ICA MID EDV: 33.6 CM/S
VAS LEFT ICA MID PSV: 88.2 CM/S
VAS LEFT ICA PROX EDV: 15 CM/S
VAS LEFT ICA PROX PSV: 50.4 CM/S
VAS LEFT ICA/CCA PSV: 1.69
VAS LEFT SUBCLAVIAN PROX PSV: 96 CM/S
VAS LEFT VERTEBRAL EDV: 11 CM/S
VAS LEFT VERTEBRAL PSV: 36.1 CM/S
VAS RIGHT CCA DIST EDV: 20.2 CM/S
VAS RIGHT CCA DIST PSV: 68.2 CM/S
VAS RIGHT CCA MID EDV: 17.3 CM/S
VAS RIGHT CCA MID PSV: 70.1 CM/S
VAS RIGHT CCA PROX EDV: 15.4 CM/S
VAS RIGHT CCA PROX PSV: 75.9 CM/S
VAS RIGHT ECA EDV: 9.12 CM/S
VAS RIGHT ECA PSV: 79.7 CM/S
VAS RIGHT ICA DIST EDV: 31.8 CM/S
VAS RIGHT ICA DIST PSV: 86.4 CM/S
VAS RIGHT ICA MID EDV: 25.4 CM/S
VAS RIGHT ICA MID PSV: 75.4 CM/S
VAS RIGHT ICA PROX EDV: 19.2 CM/S
VAS RIGHT ICA PROX PSV: 61.9 CM/S
VAS RIGHT ICA/CCA PSV: 1.23
VAS RIGHT SUBCLAVIAN PROX EDV: 0 CM/S
VAS RIGHT SUBCLAVIAN PROX PSV: 115 CM/S
VAS RIGHT VERTEBRAL EDV: 16.2 CM/S
VAS RIGHT VERTEBRAL PSV: 55.2 CM/S

## 2025-07-02 PROCEDURE — 99214 OFFICE O/P EST MOD 30 MIN: CPT | Performed by: FAMILY MEDICINE

## 2025-07-02 PROCEDURE — 93880 EXTRACRANIAL BILAT STUDY: CPT

## 2025-07-02 PROCEDURE — 93880 EXTRACRANIAL BILAT STUDY: CPT | Performed by: SURGERY

## 2025-07-02 RX ORDER — MECLIZINE HYDROCHLORIDE 25 MG/1
25 TABLET ORAL 3 TIMES DAILY PRN
Qty: 30 TABLET | Refills: 2 | Status: SHIPPED | OUTPATIENT
Start: 2025-07-02

## 2025-07-02 RX ORDER — IBUPROFEN 200 MG
200 TABLET ORAL EVERY 6 HOURS PRN
COMMUNITY

## 2025-07-02 SDOH — ECONOMIC STABILITY: FOOD INSECURITY: WITHIN THE PAST 12 MONTHS, THE FOOD YOU BOUGHT JUST DIDN'T LAST AND YOU DIDN'T HAVE MONEY TO GET MORE.: NEVER TRUE

## 2025-07-02 SDOH — ECONOMIC STABILITY: FOOD INSECURITY: WITHIN THE PAST 12 MONTHS, YOU WORRIED THAT YOUR FOOD WOULD RUN OUT BEFORE YOU GOT MONEY TO BUY MORE.: NEVER TRUE

## 2025-07-02 ASSESSMENT — PATIENT HEALTH QUESTIONNAIRE - PHQ9
8. MOVING OR SPEAKING SO SLOWLY THAT OTHER PEOPLE COULD HAVE NOTICED. OR THE OPPOSITE, BEING SO FIGETY OR RESTLESS THAT YOU HAVE BEEN MOVING AROUND A LOT MORE THAN USUAL: MORE THAN HALF THE DAYS
5. POOR APPETITE OR OVEREATING: MORE THAN HALF THE DAYS
9. THOUGHTS THAT YOU WOULD BE BETTER OFF DEAD, OR OF HURTING YOURSELF: NOT AT ALL
3. TROUBLE FALLING OR STAYING ASLEEP: SEVERAL DAYS
1. LITTLE INTEREST OR PLEASURE IN DOING THINGS: MORE THAN HALF THE DAYS
7. TROUBLE CONCENTRATING ON THINGS, SUCH AS READING THE NEWSPAPER OR WATCHING TELEVISION: MORE THAN HALF THE DAYS
SUM OF ALL RESPONSES TO PHQ QUESTIONS 1-9: 13
6. FEELING BAD ABOUT YOURSELF - OR THAT YOU ARE A FAILURE OR HAVE LET YOURSELF OR YOUR FAMILY DOWN: NOT AT ALL
4. FEELING TIRED OR HAVING LITTLE ENERGY: MORE THAN HALF THE DAYS
SUM OF ALL RESPONSES TO PHQ QUESTIONS 1-9: 13
2. FEELING DOWN, DEPRESSED OR HOPELESS: MORE THAN HALF THE DAYS
10. IF YOU CHECKED OFF ANY PROBLEMS, HOW DIFFICULT HAVE THESE PROBLEMS MADE IT FOR YOU TO DO YOUR WORK, TAKE CARE OF THINGS AT HOME, OR GET ALONG WITH OTHER PEOPLE: EXTREMELY DIFFICULT

## 2025-07-02 NOTE — PROGRESS NOTES
Resp 16   Wt 58.5 kg (129 lb)   LMP 02/25/1998 (Approximate)   SpO2 95%   BMI 25.49 kg/m²     Physical Exam  Vitals reviewed.   Psychiatric:         Mood and Affect: Mood normal.         Behavior: Behavior normal.         Thought Content: Thought content normal.         Judgment: Judgment normal.         Plan   Diagnosis Orders   1. Pelvic congestive syndrome  Davon Lancaster MD, Vascular Surgery, [facility]      2. Iliac artery stenosis, left  Davon Lancaster MD, Vascular Surgery, [facility]      3. Depressed mood  Some improvement, pt will be scheduling with Psych

## 2025-07-03 ENCOUNTER — RESULTS FOLLOW-UP (OUTPATIENT)
Dept: CARDIOLOGY CLINIC | Age: 65
End: 2025-07-03

## 2025-07-08 ENCOUNTER — TELEPHONE (OUTPATIENT)
Dept: CARDIOLOGY CLINIC | Age: 65
End: 2025-07-08

## 2025-07-08 NOTE — TELEPHONE ENCOUNTER
CARDIAC CLEARANCE REQUEST    What type of procedure are you having:Colonoscopy    Are you taking any blood thinners:NA    Type on anesthesia:Max    When is your procedure scheduled for: TBD    What physician is performing your procedure: Dr. Carias    Phone Number:[phone]    Fax number to send the letter: [phone]    Needs clearance ASAP due to trying to have done prior to end of month    [facility] 6/9/25
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
## 2025-07-09 PROBLEM — G57.62 LESION OF PLANTAR NERVE, LEFT LOWER LIMB: Status: ACTIVE | Noted: 2025-07-09

## 2025-07-09 PROBLEM — L57.9 SKIN CHANGES DUE TO CHRONIC EXPOSURE TO NONIONIZING RADIATION: Status: ACTIVE | Noted: 2025-06-05

## 2025-07-09 PROBLEM — L57.0 ACTINIC KERATOSIS: Status: ACTIVE | Noted: 2025-06-05

## 2025-07-09 PROBLEM — R10.9 RIGHT FLANK PAIN: Status: ACTIVE | Noted: 2025-06-28

## 2025-07-09 PROBLEM — J30.2 SEASONAL ALLERGIES: Status: ACTIVE | Noted: 2020-07-29

## 2025-07-09 PROBLEM — R51.9 HEADACHE: Status: ACTIVE | Noted: 2020-07-29

## 2025-07-09 PROBLEM — N90.5 ATROPHIC VULVA: Status: ACTIVE | Noted: 2020-08-11

## 2025-07-09 PROBLEM — E78.00 HYPERCHOLESTEROLEMIA: Status: ACTIVE | Noted: 2025-06-07

## 2025-07-09 PROBLEM — M24.573 EQUINUS CONTRACTURE OF ANKLE: Status: ACTIVE | Noted: 2025-07-09

## 2025-07-09 PROBLEM — D48.5 NEOPLASM OF UNCERTAIN BEHAVIOR OF SKIN: Status: ACTIVE | Noted: 2025-06-05

## 2025-07-09 PROBLEM — N39.0 ACUTE URINARY TRACT INFECTION: Status: ACTIVE | Noted: 2025-06-28

## 2025-07-09 PROBLEM — G57.60 PLANTAR NERVE LESION: Status: ACTIVE | Noted: 2025-07-09

## 2025-07-10 ENCOUNTER — OFFICE VISIT (OUTPATIENT)
Dept: VASCULAR SURGERY | Age: 65
End: 2025-07-10
Payer: MEDICARE

## 2025-07-10 VITALS
BODY MASS INDEX: 26.41 KG/M2 | SYSTOLIC BLOOD PRESSURE: 100 MMHG | OXYGEN SATURATION: 99 % | WEIGHT: 131 LBS | HEIGHT: 59 IN | TEMPERATURE: 97.7 F | DIASTOLIC BLOOD PRESSURE: 64 MMHG | HEART RATE: 54 BPM

## 2025-07-10 DIAGNOSIS — R10.9 ABDOMINAL PAIN, UNSPECIFIED ABDOMINAL LOCATION: ICD-10-CM

## 2025-07-10 DIAGNOSIS — I65.23 BILATERAL CAROTID ARTERY STENOSIS: Primary | ICD-10-CM

## 2025-07-10 DIAGNOSIS — R31.9 HEMATURIA, UNSPECIFIED TYPE: Primary | ICD-10-CM

## 2025-07-10 PROCEDURE — 99204 OFFICE O/P NEW MOD 45 MIN: CPT | Performed by: SURGERY
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
## 2025-07-11 ENCOUNTER — TELEPHONE (OUTPATIENT)
Dept: CARDIOLOGY CLINIC | Age: 65
End: 2025-07-11

## 2025-07-11 NOTE — TELEPHONE ENCOUNTER
Lady Au, 1960    Cardiac Risk Assessment    What type of procedure are you having?  Cystoscopy Bladder Bx    When is your procedure scheduled for?  07/31/2025    Medications to be stopped.  NA    What physician is performing your procedure?  Dr Lyn    Phone Number:   [phone]    Fax number to send the letter:   [phone]    Cardiologist:   DAYDAY    Last Appointment:   06/09/2025    Next Appointment:   OLIVIA f/u in 6 months

## 2025-07-28 RX ORDER — PERFLUOROHEXYLOCTANE 1 MG/MG
SOLUTION OPHTHALMIC
COMMUNITY
Start: 2025-07-10

## 2025-07-29 ENCOUNTER — TELEPHONE (OUTPATIENT)
Dept: FAMILY MEDICINE CLINIC | Age: 65
End: 2025-07-29

## 2025-07-29 ENCOUNTER — OFFICE VISIT (OUTPATIENT)
Dept: FAMILY MEDICINE CLINIC | Age: 65
End: 2025-07-29
Payer: MEDICARE

## 2025-07-29 VITALS
BODY MASS INDEX: 23.74 KG/M2 | WEIGHT: 129 LBS | HEART RATE: 68 BPM | HEIGHT: 62 IN | DIASTOLIC BLOOD PRESSURE: 73 MMHG | OXYGEN SATURATION: 97 % | TEMPERATURE: 98 F | SYSTOLIC BLOOD PRESSURE: 119 MMHG | RESPIRATION RATE: 16 BRPM

## 2025-07-29 DIAGNOSIS — Z01.818 PREOP EXAMINATION: Primary | ICD-10-CM

## 2025-07-29 PROCEDURE — 99214 OFFICE O/P EST MOD 30 MIN: CPT | Performed by: FAMILY MEDICINE

## 2025-07-29 NOTE — PROGRESS NOTES
Preoperative Consultation      Lady Au  YOB: 1960    Date of Service:  7/29/2025    Vitals:    07/29/25 0847   BP: 119/73   BP Site: Right Upper Arm   Patient Position: Sitting   BP Cuff Size: Large Adult   Pulse: 68   Resp: 16   Temp: 98 °F (36.7 °C)   TempSrc: Temporal   SpO2: 97%   Weight: 58.5 kg (129 lb)   Height: 1.575 m (5' 2\")      Wt Readings from Last 2 Encounters:   07/28/25 59 kg (130 lb)   07/29/25 58.5 kg (129 lb)     BP Readings from Last 3 Encounters:   07/29/25 119/73   07/10/25 100/64   07/02/25 110/76        Chief Complaint   Patient presents with    Pre-op Exam     7/31/25, CT scan, cystoscopy, [facility], Dr. Lyn        Allergies   Allergen Reactions    Acetaminophen Other (See Comments)    Methadone     Morphine Other (See Comments)    Oxycodone Other (See Comments)    Carisoprodol Nausea And Vomiting     Outpatient Medications Marked as Taking for the 7/29/25 encounter (Office Visit) with Ahmet Regan DO   Medication Sig Dispense Refill    MIEBO 1.338 GM/ML SOLN       ibuprofen (ADVIL;MOTRIN) 200 MG tablet Take 1 tablet by mouth every 6 hours as needed for Pain      Simethicone (GAS-X PO) Take by mouth      meclizine (ANTIVERT) 25 MG tablet Take 1 tablet by mouth 3 times daily as needed for Dizziness 30 tablet 2    metoprolol succinate (TOPROL XL) 25 MG extended release tablet Take 1 tablet by mouth daily 30 tablet 5    Probiotic, Lactobacillus, CAPS Take 1 capsule by mouth      magnesium (RA NATURAL MAGNESIUM) 250 MG TABS tablet Take 2 tablets by mouth      GEMTESA 75 MG TABS tablet Take 1 tablet by mouth daily      ondansetron (ZOFRAN) 8 MG tablet TAKE 1 TABLET BY MOUTH EVERY 6 HOURS AS NEEDED FOR NAUSEA OR VOMITING 90 tablet 0    pantoprazole (PROTONIX) 40 MG tablet TAKE 1 TABLET BY MOUTH EVERY DAY 90 tablet 1    rosuvastatin (CRESTOR) 20 MG tablet Take 1 tablet by mouth daily 90 tablet 3    aspirin 81 MG EC tablet Take 1 tablet by mouth daily 90

## 2025-07-29 NOTE — TELEPHONE ENCOUNTER
E[facility] with [facility] called to inform Dr. Regan that she will be under general anesthesa for surgery

## 2025-08-04 ENCOUNTER — ANESTHESIA EVENT (OUTPATIENT)
Age: 65
End: 2025-08-04
Payer: MEDICARE

## 2025-08-04 RX ORDER — ONDANSETRON 8 MG/1
8 TABLET, FILM COATED ORAL EVERY 6 HOURS PRN
Qty: 90 TABLET | Refills: 0 | Status: SHIPPED | OUTPATIENT
Start: 2025-08-04

## 2025-08-05 ENCOUNTER — ANESTHESIA (OUTPATIENT)
Age: 65
End: 2025-08-05
Payer: MEDICARE

## 2025-08-05 ENCOUNTER — HOSPITAL ENCOUNTER (OUTPATIENT)
Age: 65
Setting detail: OUTPATIENT SURGERY
Discharge: HOME | End: 2025-08-05
Attending: INTERNAL MEDICINE | Admitting: INTERNAL MEDICINE
Payer: MEDICARE

## 2025-08-05 VITALS
OXYGEN SATURATION: 100 % | WEIGHT: 130 LBS | HEART RATE: 59 BPM | SYSTOLIC BLOOD PRESSURE: 117 MMHG | RESPIRATION RATE: 16 BRPM | HEIGHT: 62 IN | BODY MASS INDEX: 23.92 KG/M2 | DIASTOLIC BLOOD PRESSURE: 66 MMHG | TEMPERATURE: 96.7 F

## 2025-08-05 DIAGNOSIS — Z86.0100 HISTORY OF COLON POLYPS: ICD-10-CM

## 2025-08-05 PROCEDURE — 3700000000 HC ANESTHESIA ATTENDED CARE: Performed by: INTERNAL MEDICINE

## 2025-08-05 PROCEDURE — 3700000001 HC ADD 15 MINUTES (ANESTHESIA): Performed by: INTERNAL MEDICINE

## 2025-08-05 PROCEDURE — 7100000010 HC PHASE II RECOVERY - FIRST 15 MIN: Performed by: INTERNAL MEDICINE

## 2025-08-05 PROCEDURE — 88305 TISSUE EXAM BY PATHOLOGIST: CPT

## 2025-08-05 PROCEDURE — 7100000011 HC PHASE II RECOVERY - ADDTL 15 MIN: Performed by: INTERNAL MEDICINE

## 2025-08-05 PROCEDURE — 2580000003 HC RX 258: Performed by: ANESTHESIOLOGY

## 2025-08-05 PROCEDURE — 6360000002 HC RX W HCPCS: Performed by: NURSE ANESTHETIST, CERTIFIED REGISTERED

## 2025-08-05 PROCEDURE — 2580000003 HC RX 258: Performed by: NURSE ANESTHETIST, CERTIFIED REGISTERED

## 2025-08-05 PROCEDURE — 2709999900 HC NON-CHARGEABLE SUPPLY: Performed by: INTERNAL MEDICINE

## 2025-08-05 PROCEDURE — 3609010600 HC COLONOSCOPY POLYPECTOMY SNARE/COLD BIOPSY: Performed by: INTERNAL MEDICINE

## 2025-08-05 RX ORDER — SODIUM CHLORIDE 9 MG/ML
INJECTION, SOLUTION INTRAVENOUS PRN
Status: DISCONTINUED | OUTPATIENT
Start: 2025-08-05 | End: 2025-08-05 | Stop reason: HOSPADM

## 2025-08-05 RX ORDER — SODIUM CHLORIDE 0.9 % (FLUSH) 0.9 %
5-40 SYRINGE (ML) INJECTION PRN
Status: DISCONTINUED | OUTPATIENT
Start: 2025-08-05 | End: 2025-08-05 | Stop reason: HOSPADM

## 2025-08-05 RX ORDER — SODIUM CHLORIDE 9 MG/ML
INJECTION, SOLUTION INTRAVENOUS
Status: DISCONTINUED | OUTPATIENT
Start: 2025-08-05 | End: 2025-08-05 | Stop reason: SDUPTHER

## 2025-08-05 RX ORDER — LIDOCAINE HYDROCHLORIDE 20 MG/ML
INJECTION, SOLUTION EPIDURAL; INFILTRATION; INTRACAUDAL; PERINEURAL
Status: DISCONTINUED | OUTPATIENT
Start: 2025-08-05 | End: 2025-08-05 | Stop reason: SDUPTHER

## 2025-08-05 RX ORDER — SODIUM CHLORIDE 0.9 % (FLUSH) 0.9 %
5-40 SYRINGE (ML) INJECTION EVERY 12 HOURS SCHEDULED
Status: DISCONTINUED | OUTPATIENT
Start: 2025-08-05 | End: 2025-08-05 | Stop reason: HOSPADM

## 2025-08-05 RX ORDER — PROPOFOL 10 MG/ML
INJECTION, EMULSION INTRAVENOUS
Status: DISCONTINUED | OUTPATIENT
Start: 2025-08-05 | End: 2025-08-05 | Stop reason: SDUPTHER

## 2025-08-05 RX ADMIN — SODIUM CHLORIDE: 0.9 INJECTION, SOLUTION INTRAVENOUS at 07:37

## 2025-08-05 RX ADMIN — LIDOCAINE HYDROCHLORIDE 60 MG: 20 INJECTION, SOLUTION EPIDURAL; INFILTRATION; INTRACAUDAL; PERINEURAL at 09:01

## 2025-08-05 RX ADMIN — PROPOFOL 180 MCG/KG/MIN: 10 INJECTION, EMULSION INTRAVENOUS at 09:02

## 2025-08-05 RX ADMIN — PROPOFOL 70 MG: 10 INJECTION, EMULSION INTRAVENOUS at 09:01

## 2025-08-05 RX ADMIN — SODIUM CHLORIDE: 9 INJECTION, SOLUTION INTRAVENOUS at 08:55

## 2025-08-05 ASSESSMENT — LIFESTYLE VARIABLES: SMOKING_STATUS: 0

## 2025-08-05 ASSESSMENT — PAIN SCALES - GENERAL
PAINLEVEL_OUTOF10: 0
PAINLEVEL_OUTOF10: 0

## 2025-08-06 LAB — SURGICAL PATHOLOGY REPORT: NORMAL

## 2025-08-19 ENCOUNTER — HOSPITAL ENCOUNTER (OUTPATIENT)
Dept: MAMMOGRAPHY | Age: 65
Discharge: HOME | End: 2025-08-19
Payer: MEDICARE

## 2025-08-19 VITALS — HEIGHT: 62 IN | BODY MASS INDEX: 23.92 KG/M2 | WEIGHT: 130 LBS

## 2025-08-19 DIAGNOSIS — Z12.31 ENCOUNTER FOR SCREENING MAMMOGRAM FOR BREAST CANCER: ICD-10-CM

## 2025-08-19 PROCEDURE — 77063 BREAST TOMOSYNTHESIS BI: CPT

## (undated) DEVICE — SOLUTION IRRIG 1000ML STRL H2O USP PLAS POUR BTL

## (undated) DEVICE — SYRINGE BLB 60 CC TIP PROTECTOR STRL LF

## (undated) DEVICE — ENDOSCOPIC KIT 1.1+ 6 FT 2 GWN AAMI LEVEL 3

## (undated) DEVICE — FORCEPS BX PED L160CM JAW DIA1.8MM WRK CHN 2MM W/ NDL DISP

## (undated) DEVICE — TUBING SCTION CONN 1/4X12 RIB

## (undated) DEVICE — SNARE COLD DIAMOND 10MM THIN

## (undated) DEVICE — ADAPTER AIR/WATER CHANNEL CLEANING OLYMPUS COMPATIBLE NS

## (undated) DEVICE — TRAP SPEC POLYP REM STRNR CLN DSGN MAGNIFYING WIND DISP

## (undated) DEVICE — BRUSH CLN WRK L220CM CHN DIA2-4.2MM PLAS OPN STIFFER

## (undated) DEVICE — VALVE SUCTION AIR H2O SET ORCA POD + DISP